# Patient Record
Sex: MALE | Race: WHITE | URBAN - METROPOLITAN AREA
[De-identification: names, ages, dates, MRNs, and addresses within clinical notes are randomized per-mention and may not be internally consistent; named-entity substitution may affect disease eponyms.]

---

## 2024-02-09 ENCOUNTER — PRE-ADMISSION TESTING (OUTPATIENT)
Dept: PREADMISSION TESTING | Facility: HOSPITAL | Age: 69
End: 2024-02-09
Payer: MEDICARE

## 2024-02-09 ENCOUNTER — LAB (OUTPATIENT)
Dept: LAB | Facility: LAB | Age: 69
End: 2024-02-09
Payer: MEDICARE

## 2024-02-09 VITALS
TEMPERATURE: 97.3 F | DIASTOLIC BLOOD PRESSURE: 80 MMHG | BODY MASS INDEX: 28.03 KG/M2 | WEIGHT: 195.77 LBS | RESPIRATION RATE: 16 BRPM | OXYGEN SATURATION: 94 % | HEIGHT: 70 IN | HEART RATE: 94 BPM | SYSTOLIC BLOOD PRESSURE: 134 MMHG

## 2024-02-09 DIAGNOSIS — Z01.818 PRE-OP TESTING: Primary | ICD-10-CM

## 2024-02-09 DIAGNOSIS — M16.11 UNILATERAL PRIMARY OSTEOARTHRITIS, RIGHT HIP: ICD-10-CM

## 2024-02-09 DIAGNOSIS — Z01.818 PRE-OP TESTING: ICD-10-CM

## 2024-02-09 LAB
ANION GAP SERPL CALC-SCNC: 12 MMOL/L (ref 10–20)
BUN SERPL-MCNC: 24 MG/DL (ref 6–23)
CALCIUM SERPL-MCNC: 9.5 MG/DL (ref 8.6–10.3)
CHLORIDE SERPL-SCNC: 101 MMOL/L (ref 98–107)
CO2 SERPL-SCNC: 27 MMOL/L (ref 21–32)
CREAT SERPL-MCNC: 0.69 MG/DL (ref 0.5–1.3)
EGFRCR SERPLBLD CKD-EPI 2021: >90 ML/MIN/1.73M*2
ERYTHROCYTE [DISTWIDTH] IN BLOOD BY AUTOMATED COUNT: 13.4 % (ref 11.5–14.5)
GLUCOSE SERPL-MCNC: 220 MG/DL (ref 74–99)
HCT VFR BLD AUTO: 43.1 % (ref 41–52)
HGB BLD-MCNC: 14.1 G/DL (ref 13.5–17.5)
MCH RBC QN AUTO: 30.5 PG (ref 26–34)
MCHC RBC AUTO-ENTMCNC: 32.7 G/DL (ref 32–36)
MCV RBC AUTO: 93 FL (ref 80–100)
NRBC BLD-RTO: 0 /100 WBCS (ref 0–0)
PLATELET # BLD AUTO: 259 X10*3/UL (ref 150–450)
POTASSIUM SERPL-SCNC: 4.3 MMOL/L (ref 3.5–5.3)
RBC # BLD AUTO: 4.62 X10*6/UL (ref 4.5–5.9)
SODIUM SERPL-SCNC: 136 MMOL/L (ref 136–145)
WBC # BLD AUTO: 10.3 X10*3/UL (ref 4.4–11.3)

## 2024-02-09 PROCEDURE — 87081 CULTURE SCREEN ONLY: CPT | Mod: STJLAB

## 2024-02-09 PROCEDURE — 36415 COLL VENOUS BLD VENIPUNCTURE: CPT

## 2024-02-09 PROCEDURE — 85027 COMPLETE CBC AUTOMATED: CPT

## 2024-02-09 PROCEDURE — 86900 BLOOD TYPING SEROLOGIC ABO: CPT

## 2024-02-09 PROCEDURE — 80048 BASIC METABOLIC PNL TOTAL CA: CPT

## 2024-02-09 PROCEDURE — 93010 ELECTROCARDIOGRAM REPORT: CPT | Performed by: INTERNAL MEDICINE

## 2024-02-09 PROCEDURE — 99204 OFFICE O/P NEW MOD 45 MIN: CPT | Performed by: NURSE PRACTITIONER

## 2024-02-09 PROCEDURE — 86850 RBC ANTIBODY SCREEN: CPT

## 2024-02-09 PROCEDURE — 86901 BLOOD TYPING SEROLOGIC RH(D): CPT

## 2024-02-09 PROCEDURE — 93005 ELECTROCARDIOGRAM TRACING: CPT

## 2024-02-09 RX ORDER — HYDROGEN PEROXIDE 3 %
20 SOLUTION, NON-ORAL MISCELLANEOUS
COMMUNITY

## 2024-02-09 RX ORDER — MINERAL OIL
180 ENEMA (ML) RECTAL DAILY PRN
COMMUNITY

## 2024-02-09 RX ORDER — CHLORHEXIDINE GLUCONATE ORAL RINSE 1.2 MG/ML
SOLUTION DENTAL
Qty: 473 ML | Refills: 0 | Status: SHIPPED | OUTPATIENT
Start: 2024-02-09 | End: 2024-02-20 | Stop reason: HOSPADM

## 2024-02-09 RX ORDER — MULTIVITAMIN/IRON/FOLIC ACID 18MG-0.4MG
1 TABLET ORAL DAILY
COMMUNITY

## 2024-02-09 RX ORDER — PREGABALIN 100 MG/1
100 CAPSULE ORAL 2 TIMES DAILY
COMMUNITY

## 2024-02-09 RX ORDER — ATENOLOL 25 MG/1
25 TABLET ORAL DAILY
COMMUNITY

## 2024-02-09 RX ORDER — L. ACIDOPHILUS/L.BULGARICUS 1MM CELL
1 TABLET ORAL DAILY
COMMUNITY

## 2024-02-09 RX ORDER — ZINC GLUCONATE 50 MG
50 TABLET ORAL DAILY
COMMUNITY

## 2024-02-09 RX ORDER — BISMUTH SUBSALICYLATE 262 MG
1 TABLET,CHEWABLE ORAL DAILY
COMMUNITY

## 2024-02-09 RX ORDER — CYCLOSPORINE 0.5 MG/ML
1 EMULSION OPHTHALMIC 2 TIMES DAILY
COMMUNITY

## 2024-02-09 RX ORDER — METFORMIN HYDROCHLORIDE 500 MG/1
500 TABLET, EXTENDED RELEASE ORAL
COMMUNITY

## 2024-02-09 RX ORDER — MONTELUKAST SODIUM 10 MG/1
10 TABLET ORAL NIGHTLY
COMMUNITY

## 2024-02-09 RX ORDER — ATORVASTATIN CALCIUM 20 MG/1
20 TABLET, FILM COATED ORAL NIGHTLY
COMMUNITY

## 2024-02-09 RX ORDER — FLUTICASONE PROPIONATE AND SALMETEROL 250; 50 UG/1; UG/1
1 POWDER RESPIRATORY (INHALATION)
COMMUNITY

## 2024-02-09 RX ORDER — CHOLECALCIFEROL (VITAMIN D3) 25 MCG
1000 TABLET ORAL DAILY
COMMUNITY

## 2024-02-09 RX ORDER — ASCORBIC ACID 500 MG
500 TABLET ORAL DAILY
COMMUNITY

## 2024-02-09 ASSESSMENT — LIFESTYLE VARIABLES: SMOKING_STATUS: NONSMOKER

## 2024-02-09 ASSESSMENT — CHADS2 SCORE
CHF: NO
PRIOR STROKE OR TIA OR THROMBOEMBOLISM: NO
HYPERTENSION: YES
CHADS2 SCORE: 2
AGE GREATER THAN OR EQUAL TO 75: NO
DIABETES: YES

## 2024-02-09 ASSESSMENT — DUKE ACTIVITY SCORE INDEX (DASI)
DASI METS SCORE: 6.4
TOTAL_SCORE: 29.45
CAN YOU HAVE SEXUAL RELATIONS: NO
CAN YOU DO MODERATE WORK AROUND THE HOUSE LIKE VACUUMING, SWEEPING FLOORS OR CARRYING GROCERIES: YES
CAN YOU WALK INDOORS, SUCH AS AROUND YOUR HOUSE: YES
CAN YOU RUN A SHORT DISTANCE: NO
CAN YOU PARTICIPATE IN MODERATE RECREATIONAL ACTIVITIES LIKE GOLF, BOWLING, DANCING, DOUBLES TENNIS OR THROWING A BASEBALL OR FOOTBALL: YES
CAN YOU CLIMB A FLIGHT OF STAIRS OR WALK UP A HILL: YES
CAN YOU DO HEAVY WORK AROUND THE HOUSE LIKE SCRUBBING FLOORS OR LIFTING AND MOVING HEAVY FURNITURE: NO
CAN YOU PARTICIPATE IN STRENOUS SPORTS LIKE SWIMMING, SINGLES TENNIS, FOOTBALL, BASKETBALL, OR SKIING: NO
CAN YOU WALK A BLOCK OR TWO ON LEVEL GROUND: YES
CAN YOU DO LIGHT WORK AROUND THE HOUSE LIKE DUSTING OR WASHING DISHES: YES
CAN YOU TAKE CARE OF YOURSELF (EAT, DRESS, BATHE, OR USE TOILET): YES
CAN YOU DO YARD WORK LIKE RAKING LEAVES, WEEDING OR PUSHING A MOWER: YES

## 2024-02-09 ASSESSMENT — ACTIVITIES OF DAILY LIVING (ADL): ADL_SCORE: 0

## 2024-02-09 NOTE — CPM/PAT H&P
CPM/PAT Evaluation       Name: John Casale (John Casale)  /Age: 1955/68 y.o.     In-Person       Chief Complaint: right hip pains    HPI    EUNICE is a 67 yo male who has history of left THR with no issues and now has been having right hip pains for few years- imaging shows right hip OA- subsequently he is scheduled for right THR. Skin intact on surgical limb. He denies any recent steroid injections but he is currently on oral steroids for recent URI- has 1-2 more days left. URI symptoms have improved other than lingering dry cough. Otherwise denies any recent fever/chills, chest pains or shortness of breath.     Past Medical History:   Diagnosis Date    Arthritis     Asthma     Cataract     Diabetes mellitus (CMS/HCC)     Diverticulosis     GERD (gastroesophageal reflux disease)     Hyperlipidemia     Hypertension     Liver abscess     Lumbar spondylosis     Palpitations     Sleep apnea     cpap    Spinal stenosis      PCP: Dr. Washington  Pulm: Dr. Perez    Chest Xray 2024  RESULT:    Lines, tubes, and devices:  None.    Lungs and pleura:  No consolidation. No lung mass. No pleural effusion.    Cardiomediastinal silhouette:  Normal cardiomediastinal silhouette.    Other:  Mild degenerative change of the spine     Hemoglobin A1C   Date Value Ref Range Status   2020 7.4 (H) 4.3 - 5.6 % Final     Comment:     American Diabetes Association guidelines indicate that patients with HgbA1c in   the range 5.7-6.4% are at increased risk for development of diabetes, and   intervention by lifestyle modification may be beneficial. HgbA1c greater or   equal to 6.5% is considered diagnostic of diabetes.        Past Surgical History:   Procedure Laterality Date    CATARACT EXTRACTION      COLONOSCOPY      HIP ARTHROPLASTY      OTHER SURGICAL HISTORY      sigmoid colectomy with colostomyand reversal    TONSILLECTOMY      UMBILICAL HERNIA REPAIR      UPPER GASTROINTESTINAL ENDOSCOPY         Patient  reports that he is not  currently sexually active.    Family History   Problem Relation Name Age of Onset    Asthma Mother      Lung cancer Mother      Kidney failure Father      Other (hemodialysis) Father      No Known Problems Sister      Other (smoker) Maternal Grandmother      No Known Problems Maternal Grandfather      No Known Problems Paternal Grandmother      No Known Problems Paternal Grandfather         Allergies   Allergen Reactions    Codeine GI Upset       Prior to Admission medications    Not on File        PAT ROS   Constitutional: Negative for fever, chills, or sweats   ENMT: Negative for nasal discharge, congestion, ear pain, mouth pain, throat pain   Respiratory: Negative wheezing, shortness of breath; positive endorses dry cough     Cardiac: Negative for chest pain, dyspnea on exertion, palpitations   Gastrointestinal: Negative for nausea, vomiting, diarrhea, constipation, abdominal pain  Genitourinary: Negative for dysuria, flank pain, frequency, hematuria     Musculoskeletal: Positive for decreased ROM, pain, swelling, weakness in right hip    Neurological: Negative for dizziness, confusion, headache  Psychiatric: Negative for mood changes   Skin: Negative for itching, rash, ulcer    Hematologic/Lymph: Negative for bruising, easy bleeding  Allergic/Immunologic: Negative itching, sneezing, swelling      Physical Exam  HENT:      Head: Normocephalic.      Mouth/Throat:      Mouth: Mucous membranes are moist.   Eyes:      Extraocular Movements: Extraocular movements intact.   Cardiovascular:      Rate and Rhythm: Normal rate and regular rhythm.   Pulmonary:      Effort: Pulmonary effort is normal.      Breath sounds: Normal breath sounds.   Abdominal:      General: Abdomen is flat.      Palpations: Abdomen is soft.   Musculoskeletal:         General: Normal range of motion.      Cervical back: Normal range of motion.   Skin:     General: Skin is warm and dry.   Neurological:      General: No focal deficit present.       Mental Status: He is alert.   Psychiatric:         Mood and Affect: Mood normal.          PAT AIRWAY:   Airway:     Neck ROM::  Full  normal      Anesthesia:  Patient denies any anesthesia complications.     Visit Vitals  /80   Pulse 94   Temp 36.3 °C (97.3 °F) (Temporal)   Resp 16       DASI Risk Score      Flowsheet Row Most Recent Value   DASI SCORE 29.45   METS Score (Will be calculated only when all the questions are answered) 6.4          Caprini DVT Assessment      Flowsheet Row Most Recent Value   DVT Score 11   Current Status Major surgery planned, including arthroscopic and laproscopic (1-2 hours), Elective major lower extremity arthroplasty   History Prior major surgery   Age 60-75 years   BMI 30 or less          Modified Frailty Index      Flowsheet Row Most Recent Value   Modified Frailty Index Calculator .1818          CHADS2 Stroke Risk  Current as of 9 minutes ago        N/A 3 - 100%: High Risk   2 - 3%: Medium Risk   0 - 2%: Low Risk     Last Change: N/A          This score determines the patient's risk of having a stroke if the patient has atrial fibrillation.        This score is not applicable to this patient. Components are not calculated.          Revised Cardiac Risk Index      Flowsheet Row Most Recent Value   Revised Cardiac Risk Calculator 0          Apfel Simplified Score      Flowsheet Row Most Recent Value   Apfel Simplified Score Calculator 2          Risk Analysis Index Results This Encounter         2/9/2024  1417             WALTERS Cancer History: Patient does not indicate history of cancer    Total Risk Analysis Index Score Without Cancer: 23    Total Risk Analysis Index Score: 23          Stop Bang Score      Flowsheet Row Most Recent Value   Do you snore loudly? 1   Do you often feel tired or fatigued after your sleep? 1   Has anyone ever observed you stop breathing in your sleep? 0   Do you have or are you being treated for high blood pressure? 1   Recent BMI (Calculated) 28.1    Is BMI greater than 35 kg/m2? 0=No   Age older than 50 years old? 1=Yes   Is your neck circumference greater than 17 inches (Male) or 16 inches (Female)? 0   Gender - Male 1=Yes   STOP-BANG Total Score 5            Assessment and Plan:     68-year-old male scheduled for right total hip arthroplasty with posterior approach on 2/19/2024 with Dr. Logan.  Blood work and MRSA ordered-oral chlorhexidine prescribed.  EKG shows NSR with left axis, v rate of 87 bpm- comparable EKG on file in 2022. He is instructed to notify surgeon office if any new skin changes occur to surgical limb. He was instructed to call pulmonology to inquire about new biologic for asthma that he is scheduled to receive next week- he plans to coordinate with him. Otherwise no further orders indicated.     See risk scores as previously documented.

## 2024-02-09 NOTE — H&P (VIEW-ONLY)
CPM/PAT Evaluation       Name: John Casale (John Casale)  /Age: 1955/68 y.o.     In-Person       Chief Complaint: right hip pains    HPI    EUNICE is a 69 yo male who has history of left THR with no issues and now has been having right hip pains for few years- imaging shows right hip OA- subsequently he is scheduled for right THR. Skin intact on surgical limb. He denies any recent steroid injections but he is currently on oral steroids for recent URI- has 1-2 more days left. URI symptoms have improved other than lingering dry cough. Otherwise denies any recent fever/chills, chest pains or shortness of breath.     Past Medical History:   Diagnosis Date    Arthritis     Asthma     Cataract     Diabetes mellitus (CMS/HCC)     Diverticulosis     GERD (gastroesophageal reflux disease)     Hyperlipidemia     Hypertension     Liver abscess     Lumbar spondylosis     Palpitations     Sleep apnea     cpap    Spinal stenosis      PCP: Dr. Washington  Pulm: Dr. Perez    Chest Xray 2024  RESULT:    Lines, tubes, and devices:  None.    Lungs and pleura:  No consolidation. No lung mass. No pleural effusion.    Cardiomediastinal silhouette:  Normal cardiomediastinal silhouette.    Other:  Mild degenerative change of the spine     Hemoglobin A1C   Date Value Ref Range Status   2020 7.4 (H) 4.3 - 5.6 % Final     Comment:     American Diabetes Association guidelines indicate that patients with HgbA1c in   the range 5.7-6.4% are at increased risk for development of diabetes, and   intervention by lifestyle modification may be beneficial. HgbA1c greater or   equal to 6.5% is considered diagnostic of diabetes.        Past Surgical History:   Procedure Laterality Date    CATARACT EXTRACTION      COLONOSCOPY      HIP ARTHROPLASTY      OTHER SURGICAL HISTORY      sigmoid colectomy with colostomyand reversal    TONSILLECTOMY      UMBILICAL HERNIA REPAIR      UPPER GASTROINTESTINAL ENDOSCOPY         Patient  reports that he is not  currently sexually active.    Family History   Problem Relation Name Age of Onset    Asthma Mother      Lung cancer Mother      Kidney failure Father      Other (hemodialysis) Father      No Known Problems Sister      Other (smoker) Maternal Grandmother      No Known Problems Maternal Grandfather      No Known Problems Paternal Grandmother      No Known Problems Paternal Grandfather         Allergies   Allergen Reactions    Codeine GI Upset       Prior to Admission medications    Not on File        PAT ROS   Constitutional: Negative for fever, chills, or sweats   ENMT: Negative for nasal discharge, congestion, ear pain, mouth pain, throat pain   Respiratory: Negative wheezing, shortness of breath; positive endorses dry cough     Cardiac: Negative for chest pain, dyspnea on exertion, palpitations   Gastrointestinal: Negative for nausea, vomiting, diarrhea, constipation, abdominal pain  Genitourinary: Negative for dysuria, flank pain, frequency, hematuria     Musculoskeletal: Positive for decreased ROM, pain, swelling, weakness in right hip    Neurological: Negative for dizziness, confusion, headache  Psychiatric: Negative for mood changes   Skin: Negative for itching, rash, ulcer    Hematologic/Lymph: Negative for bruising, easy bleeding  Allergic/Immunologic: Negative itching, sneezing, swelling      Physical Exam  HENT:      Head: Normocephalic.      Mouth/Throat:      Mouth: Mucous membranes are moist.   Eyes:      Extraocular Movements: Extraocular movements intact.   Cardiovascular:      Rate and Rhythm: Normal rate and regular rhythm.   Pulmonary:      Effort: Pulmonary effort is normal.      Breath sounds: Normal breath sounds.   Abdominal:      General: Abdomen is flat.      Palpations: Abdomen is soft.   Musculoskeletal:         General: Normal range of motion.      Cervical back: Normal range of motion.   Skin:     General: Skin is warm and dry.   Neurological:      General: No focal deficit present.       Mental Status: He is alert.   Psychiatric:         Mood and Affect: Mood normal.          PAT AIRWAY:   Airway:     Neck ROM::  Full  normal      Anesthesia:  Patient denies any anesthesia complications.     Visit Vitals  /80   Pulse 94   Temp 36.3 °C (97.3 °F) (Temporal)   Resp 16       DASI Risk Score      Flowsheet Row Most Recent Value   DASI SCORE 29.45   METS Score (Will be calculated only when all the questions are answered) 6.4          Caprini DVT Assessment      Flowsheet Row Most Recent Value   DVT Score 11   Current Status Major surgery planned, including arthroscopic and laproscopic (1-2 hours), Elective major lower extremity arthroplasty   History Prior major surgery   Age 60-75 years   BMI 30 or less          Modified Frailty Index      Flowsheet Row Most Recent Value   Modified Frailty Index Calculator .1818          CHADS2 Stroke Risk  Current as of 9 minutes ago        N/A 3 - 100%: High Risk   2 - 3%: Medium Risk   0 - 2%: Low Risk     Last Change: N/A          This score determines the patient's risk of having a stroke if the patient has atrial fibrillation.        This score is not applicable to this patient. Components are not calculated.          Revised Cardiac Risk Index      Flowsheet Row Most Recent Value   Revised Cardiac Risk Calculator 0          Apfel Simplified Score      Flowsheet Row Most Recent Value   Apfel Simplified Score Calculator 2          Risk Analysis Index Results This Encounter         2/9/2024  1417             WALTERS Cancer History: Patient does not indicate history of cancer    Total Risk Analysis Index Score Without Cancer: 23    Total Risk Analysis Index Score: 23          Stop Bang Score      Flowsheet Row Most Recent Value   Do you snore loudly? 1   Do you often feel tired or fatigued after your sleep? 1   Has anyone ever observed you stop breathing in your sleep? 0   Do you have or are you being treated for high blood pressure? 1   Recent BMI (Calculated) 28.1    Is BMI greater than 35 kg/m2? 0=No   Age older than 50 years old? 1=Yes   Is your neck circumference greater than 17 inches (Male) or 16 inches (Female)? 0   Gender - Male 1=Yes   STOP-BANG Total Score 5            Assessment and Plan:     68-year-old male scheduled for right total hip arthroplasty with posterior approach on 2/19/2024 with Dr. Logan.  Blood work and MRSA ordered-oral chlorhexidine prescribed.  EKG shows NSR with left axis, v rate of 87 bpm- comparable EKG on file in 2022. He is instructed to notify surgeon office if any new skin changes occur to surgical limb. He was instructed to call pulmonology to inquire about new biologic for asthma that he is scheduled to receive next week- he plans to coordinate with him. Otherwise no further orders indicated.     See risk scores as previously documented.

## 2024-02-09 NOTE — PREPROCEDURE INSTRUCTIONS
Medication List            Accurate as of February 9, 2024  2:43 PM. Always use your most recent med list.                ascorbic acid 500 mg tablet  Commonly known as: Vitamin C  Medication Adjustments for Surgery: Stop 7 days before surgery     atenolol 25 mg tablet  Commonly known as: Tenormin  Medication Adjustments for Surgery: Take morning of surgery with sip of water, no other fluids     atorvastatin 20 mg tablet  Commonly known as: Lipitor  Medication Adjustments for Surgery: Other (Comment)  Notes to patient: May take the morning of surgery if this medication is prescribed to take in the mornings     b complex 0.4 mg tablet  Medication Adjustments for Surgery: Stop 7 days before surgery     cholecalciferol 25 MCG (1000 UT) tablet  Commonly known as: Vitamin D-3  Medication Adjustments for Surgery: Stop 7 days before surgery     cycloSPORINE 0.05 % ophthalmic emulsion  Commonly known as: Restasis  Medication Adjustments for Surgery: Continue until night before surgery     esomeprazole 20 mg DR capsule  Commonly known as: NexIUM  Medication Adjustments for Surgery: Take morning of surgery with sip of water, no other fluids     fexofenadine 180 mg tablet  Commonly known as: Allegra  Medication Adjustments for Surgery: Take morning of surgery with sip of water, no other fluids     fluticasone propion-salmeteroL 250-50 mcg/dose diskus inhaler  Commonly known as: Advair Diskus  Medication Adjustments for Surgery: Other (Comment)  Notes to patient: May take the morning of surgery if this medication is prescribed to take in the mornings     lactobacillus acidophilus tablet tablet  Medication Adjustments for Surgery: Stop 7 days before surgery     metFORMIN  mg 24 hr tablet  Commonly known as: Glucophage-XR  Medication Adjustments for Surgery: Other (Comment)  Notes to patient: HOLD any evening dose the night before the day of surgery  HOLD the day of surgery     montelukast 10 mg tablet  Commonly known  as: Singulair  Medication Adjustments for Surgery: Take morning of surgery with sip of water, no other fluids     multivitamin tablet  Medication Adjustments for Surgery: Stop 7 days before surgery     pregabalin 100 mg capsule  Commonly known as: Lyrica  Medication Adjustments for Surgery: Take morning of surgery with sip of water, no other fluids     zinc gluconate 50 mg tablet  Medication Adjustments for Surgery: Stop 7 days before surgery                                        PRE-OPERATIVE INSTRUCTIONS    You will receive notification one business day prior to your surgery to confirm your arrival time and additional information. It is important that you answer your phone and/or check your messages during this time.    Please enter the building through the Outpatient entrance. Take the elevator off the lobby to the 2nd floor and check in at the Outpatient Surgery desk    INSTRUCTIONS:  Talk to your surgeon for instructions if you should stop your aspirin, blood thinner, or diabetes medicines.  DO NOT take any multivitamins or over the counter supplements for 7-10 days before surgery.  If not being admitted, you must have an adult immediately available to drive you home after surgery. We also highly recommend you have someone stay with you for the entire day and night of your surgery.  For children having surgery, a parent or legal guardian must accompany them to the surgery center. If this is not possible, please call [phone] to make additional arrangements.  For adults who are unable to consent or make medical decisions for themselves, a legal guardian or Power of  must accompany them to the surgery center. If this is not possible, please call [phone] to make additional arrangements.  Wear comfortable, loose fitting clothing.  All jewelry and piercings must be removed. If you are unable to remove an item or have a dermal piercing, please be sure to tell the nurse when you arrive for  surgery.  Nail polish and make-up must be removed.  Avoid smoking or consuming alcohol for 24 hours before surgery.  To help prevent infection, please take a shower/bath and wash your hair the night before and/or morning of surgery.    Additional instructions about eating and drinking before surgery:  Do not eat any solid foods after midnight. Milk, nutritional drinks/supplements, and infant formula are considered solid foods.  You may drink up to 12 oz. of clear liquids up to 2 hours before your arrival time for surgery, unless directed otherwise by your surgeon. Clear liquids include water, non-carbonated sports drinks (Gatorade), black tea or coffee (no creamers) and breast milk.    If you received a blue folder, please review additional information provided inside the folder regarding additional preparation.     If you have any questions or concerns, please call Pre-Admission Testing at [phone].

## 2024-02-10 LAB
ABO GROUP (TYPE) IN BLOOD: NORMAL
ANTIBODY SCREEN: NORMAL
RH FACTOR (ANTIGEN D): NORMAL

## 2024-02-11 LAB
ATRIAL RATE: 87 BPM
P AXIS: 54 DEGREES
P OFFSET: 189 MS
P ONSET: 139 MS
PR INTERVAL: 148 MS
Q ONSET: 213 MS
QRS COUNT: 14 BEATS
QRS DURATION: 84 MS
QT INTERVAL: 354 MS
QTC CALCULATION(BAZETT): 425 MS
QTC FREDERICIA: 400 MS
R AXIS: -42 DEGREES
STAPHYLOCOCCUS SPEC CULT: NORMAL
T AXIS: 3 DEGREES
T OFFSET: 390 MS
VENTRICULAR RATE: 87 BPM

## 2024-02-19 ENCOUNTER — ANESTHESIA EVENT (OUTPATIENT)
Dept: OPERATING ROOM | Facility: HOSPITAL | Age: 69
End: 2024-02-19
Payer: MEDICARE

## 2024-02-19 ENCOUNTER — HOSPITAL ENCOUNTER (OUTPATIENT)
Facility: HOSPITAL | Age: 69
LOS: 1 days | Discharge: HOME | End: 2024-02-20
Attending: ORTHOPAEDIC SURGERY | Admitting: ORTHOPAEDIC SURGERY
Payer: MEDICARE

## 2024-02-19 ENCOUNTER — ANESTHESIA (OUTPATIENT)
Dept: OPERATING ROOM | Facility: HOSPITAL | Age: 69
End: 2024-02-19
Payer: MEDICARE

## 2024-02-19 ENCOUNTER — APPOINTMENT (OUTPATIENT)
Dept: RADIOLOGY | Facility: HOSPITAL | Age: 69
End: 2024-02-19
Payer: MEDICARE

## 2024-02-19 DIAGNOSIS — Z96.641 S/P TOTAL RIGHT HIP ARTHROPLASTY: ICD-10-CM

## 2024-02-19 DIAGNOSIS — M16.11 PRIMARY OSTEOARTHRITIS OF RIGHT HIP: Primary | ICD-10-CM

## 2024-02-19 DIAGNOSIS — G89.18 ACUTE POSTOPERATIVE PAIN: ICD-10-CM

## 2024-02-19 PROBLEM — E11.9 DIABETES MELLITUS (MULTI): Chronic | Status: ACTIVE | Noted: 2024-02-19

## 2024-02-19 PROBLEM — J45.909 ASTHMA (HHS-HCC): Status: ACTIVE | Noted: 2024-02-19

## 2024-02-19 LAB
GLUCOSE BLD MANUAL STRIP-MCNC: 168 MG/DL (ref 74–99)
GLUCOSE BLD MANUAL STRIP-MCNC: 179 MG/DL (ref 74–99)
GLUCOSE BLD MANUAL STRIP-MCNC: 233 MG/DL (ref 74–99)

## 2024-02-19 PROCEDURE — A4217 STERILE WATER/SALINE, 500 ML: HCPCS | Performed by: ORTHOPAEDIC SURGERY

## 2024-02-19 PROCEDURE — 2500000005 HC RX 250 GENERAL PHARMACY W/O HCPCS: Performed by: ORTHOPAEDIC SURGERY

## 2024-02-19 PROCEDURE — 72170 X-RAY EXAM OF PELVIS: CPT

## 2024-02-19 PROCEDURE — 97116 GAIT TRAINING THERAPY: CPT | Mod: GP

## 2024-02-19 PROCEDURE — 2500000002 HC RX 250 W HCPCS SELF ADMINISTERED DRUGS (ALT 637 FOR MEDICARE OP, ALT 636 FOR OP/ED): Performed by: ORTHOPAEDIC SURGERY

## 2024-02-19 PROCEDURE — C1776 JOINT DEVICE (IMPLANTABLE): HCPCS | Performed by: ORTHOPAEDIC SURGERY

## 2024-02-19 PROCEDURE — A27130 PR TOTAL HIP ARTHROPLASTY: Performed by: ANESTHESIOLOGY

## 2024-02-19 PROCEDURE — 2500000005 HC RX 250 GENERAL PHARMACY W/O HCPCS: Performed by: ANESTHESIOLOGIST ASSISTANT

## 2024-02-19 PROCEDURE — 7100000002 HC RECOVERY ROOM TIME - EACH INCREMENTAL 1 MINUTE: Performed by: ORTHOPAEDIC SURGERY

## 2024-02-19 PROCEDURE — 3600000018 HC OR TIME - INITIAL BASE CHARGE - PROCEDURE LEVEL SIX: Performed by: ORTHOPAEDIC SURGERY

## 2024-02-19 PROCEDURE — C1713 ANCHOR/SCREW BN/BN,TIS/BN: HCPCS | Performed by: ORTHOPAEDIC SURGERY

## 2024-02-19 PROCEDURE — 2500000004 HC RX 250 GENERAL PHARMACY W/ HCPCS (ALT 636 FOR OP/ED): Performed by: ANESTHESIOLOGIST ASSISTANT

## 2024-02-19 PROCEDURE — 7100000011 HC EXTENDED STAY RECOVERY HOURLY - NURSING UNIT

## 2024-02-19 PROCEDURE — 94640 AIRWAY INHALATION TREATMENT: CPT

## 2024-02-19 PROCEDURE — 82947 ASSAY GLUCOSE BLOOD QUANT: CPT

## 2024-02-19 PROCEDURE — 2500000005 HC RX 250 GENERAL PHARMACY W/O HCPCS: Performed by: ANESTHESIOLOGY

## 2024-02-19 PROCEDURE — 7100000001 HC RECOVERY ROOM TIME - INITIAL BASE CHARGE: Performed by: ORTHOPAEDIC SURGERY

## 2024-02-19 PROCEDURE — 3600000017 HC OR TIME - EACH INCREMENTAL 1 MINUTE - PROCEDURE LEVEL SIX: Performed by: ORTHOPAEDIC SURGERY

## 2024-02-19 PROCEDURE — 72170 X-RAY EXAM OF PELVIS: CPT | Performed by: RADIOLOGY

## 2024-02-19 PROCEDURE — 2500000004 HC RX 250 GENERAL PHARMACY W/ HCPCS (ALT 636 FOR OP/ED): Performed by: ORTHOPAEDIC SURGERY

## 2024-02-19 PROCEDURE — 2720000007 HC OR 272 NO HCPCS: Performed by: ORTHOPAEDIC SURGERY

## 2024-02-19 PROCEDURE — 3700000001 HC GENERAL ANESTHESIA TIME - INITIAL BASE CHARGE: Performed by: ORTHOPAEDIC SURGERY

## 2024-02-19 PROCEDURE — 2780000003 HC OR 278 NO HCPCS: Performed by: ORTHOPAEDIC SURGERY

## 2024-02-19 PROCEDURE — 3700000002 HC GENERAL ANESTHESIA TIME - EACH INCREMENTAL 1 MINUTE: Performed by: ORTHOPAEDIC SURGERY

## 2024-02-19 PROCEDURE — 2500000004 HC RX 250 GENERAL PHARMACY W/ HCPCS (ALT 636 FOR OP/ED): Performed by: ANESTHESIOLOGY

## 2024-02-19 PROCEDURE — A27130 PR TOTAL HIP ARTHROPLASTY: Performed by: ANESTHESIOLOGIST ASSISTANT

## 2024-02-19 PROCEDURE — 2500000001 HC RX 250 WO HCPCS SELF ADMINISTERED DRUGS (ALT 637 FOR MEDICARE OP): Performed by: ORTHOPAEDIC SURGERY

## 2024-02-19 PROCEDURE — 97161 PT EVAL LOW COMPLEX 20 MIN: CPT | Mod: GP

## 2024-02-19 DEVICE — PINNACLE GRIPTION ACETABULAR SHELL MULTI-HOLE 56MM OD
Type: IMPLANTABLE DEVICE | Site: HIP | Status: FUNCTIONAL
Brand: PINNACLE GRIPTION

## 2024-02-19 DEVICE — PINNACLE HIP SOLUTIONS ALTRX POLYETHYLENE ACETABULAR LINER NEUTRAL 36MM ID 56MM OD
Type: IMPLANTABLE DEVICE | Site: HIP | Status: FUNCTIONAL
Brand: PINNACLE ALTRX

## 2024-02-19 DEVICE — BIOLOX DELTA CERAMIC FEMORAL HEAD +1.5 36MM DIA 12/14 TAPER
Type: IMPLANTABLE DEVICE | Site: HIP | Status: FUNCTIONAL
Brand: BIOLOX DELTA

## 2024-02-19 DEVICE — ACTIS DUOFIX HIP PROSTHESIS (FEMORAL STEM 12/14 TAPER CEMENTLESS SIZE 4 HIGH COLLAR)  CE
Type: IMPLANTABLE DEVICE | Site: HIP | Status: FUNCTIONAL
Brand: ACTIS

## 2024-02-19 DEVICE — PINNACLE CANCELLOUS BONE SCREW 6.5MM X 25MM
Type: IMPLANTABLE DEVICE | Site: HIP | Status: FUNCTIONAL
Brand: PINNACLE

## 2024-02-19 RX ORDER — MAGNESIUM HYDROXIDE 2400 MG/10ML
10 SUSPENSION ORAL DAILY PRN
Status: DISCONTINUED | OUTPATIENT
Start: 2024-02-19 | End: 2024-02-20 | Stop reason: HOSPADM

## 2024-02-19 RX ORDER — ACETAMINOPHEN 325 MG/1
975 TABLET ORAL EVERY 8 HOURS SCHEDULED
Status: DISCONTINUED | OUTPATIENT
Start: 2024-02-19 | End: 2024-02-20 | Stop reason: HOSPADM

## 2024-02-19 RX ORDER — PROPOFOL 10 MG/ML
INJECTION, EMULSION INTRAVENOUS AS NEEDED
Status: DISCONTINUED | OUTPATIENT
Start: 2024-02-19 | End: 2024-02-19

## 2024-02-19 RX ORDER — OXYCODONE HYDROCHLORIDE 5 MG/1
5 TABLET ORAL EVERY 4 HOURS PRN
Status: DISCONTINUED | OUTPATIENT
Start: 2024-02-19 | End: 2024-02-20 | Stop reason: HOSPADM

## 2024-02-19 RX ORDER — DEXAMETHASONE SODIUM PHOSPHATE 4 MG/ML
10 INJECTION, SOLUTION INTRA-ARTICULAR; INTRALESIONAL; INTRAMUSCULAR; INTRAVENOUS; SOFT TISSUE ONCE
Status: COMPLETED | OUTPATIENT
Start: 2024-02-20 | End: 2024-02-20

## 2024-02-19 RX ORDER — ALBUTEROL SULFATE 0.83 MG/ML
2.5 SOLUTION RESPIRATORY (INHALATION)
Status: DISCONTINUED | OUTPATIENT
Start: 2024-02-19 | End: 2024-02-19 | Stop reason: HOSPADM

## 2024-02-19 RX ORDER — SODIUM CHLORIDE, SODIUM LACTATE, POTASSIUM CHLORIDE, CALCIUM CHLORIDE 600; 310; 30; 20 MG/100ML; MG/100ML; MG/100ML; MG/100ML
100 INJECTION, SOLUTION INTRAVENOUS CONTINUOUS
Status: DISCONTINUED | OUTPATIENT
Start: 2024-02-19 | End: 2024-02-19

## 2024-02-19 RX ORDER — PREGABALIN 100 MG/1
100 CAPSULE ORAL 2 TIMES DAILY
Status: DISCONTINUED | OUTPATIENT
Start: 2024-02-19 | End: 2024-02-20 | Stop reason: HOSPADM

## 2024-02-19 RX ORDER — MIDAZOLAM HYDROCHLORIDE 1 MG/ML
1 INJECTION, SOLUTION INTRAMUSCULAR; INTRAVENOUS ONCE AS NEEDED
Status: DISCONTINUED | OUTPATIENT
Start: 2024-02-19 | End: 2024-02-19 | Stop reason: HOSPADM

## 2024-02-19 RX ORDER — LORATADINE 10 MG/1
10 TABLET ORAL DAILY
Status: DISCONTINUED | OUTPATIENT
Start: 2024-02-20 | End: 2024-02-20 | Stop reason: HOSPADM

## 2024-02-19 RX ORDER — BUDESONIDE 0.5 MG/2ML
0.5 INHALANT ORAL
Status: DISCONTINUED | OUTPATIENT
Start: 2024-02-19 | End: 2024-02-20 | Stop reason: HOSPADM

## 2024-02-19 RX ORDER — AMOXICILLIN 250 MG
1 CAPSULE ORAL DAILY
Status: DISCONTINUED | OUTPATIENT
Start: 2024-02-20 | End: 2024-02-20 | Stop reason: HOSPADM

## 2024-02-19 RX ORDER — ONDANSETRON HYDROCHLORIDE 2 MG/ML
INJECTION, SOLUTION INTRAVENOUS AS NEEDED
Status: DISCONTINUED | OUTPATIENT
Start: 2024-02-19 | End: 2024-02-19

## 2024-02-19 RX ORDER — CYCLOSPORINE 0.5 MG/ML
1 EMULSION OPHTHALMIC 2 TIMES DAILY
Status: DISCONTINUED | OUTPATIENT
Start: 2024-02-19 | End: 2024-02-19 | Stop reason: ALTCHOICE

## 2024-02-19 RX ORDER — HYDROMORPHONE HYDROCHLORIDE 1 MG/ML
0.5 INJECTION, SOLUTION INTRAMUSCULAR; INTRAVENOUS; SUBCUTANEOUS EVERY 5 MIN PRN
Status: DISCONTINUED | OUTPATIENT
Start: 2024-02-19 | End: 2024-02-19 | Stop reason: HOSPADM

## 2024-02-19 RX ORDER — HYDROMORPHONE HYDROCHLORIDE 1 MG/ML
1 INJECTION, SOLUTION INTRAMUSCULAR; INTRAVENOUS; SUBCUTANEOUS EVERY 5 MIN PRN
Status: DISCONTINUED | OUTPATIENT
Start: 2024-02-19 | End: 2024-02-19 | Stop reason: HOSPADM

## 2024-02-19 RX ORDER — PANTOPRAZOLE SODIUM 20 MG/1
20 TABLET, DELAYED RELEASE ORAL
Status: DISCONTINUED | OUTPATIENT
Start: 2024-02-20 | End: 2024-02-19 | Stop reason: SDUPTHER

## 2024-02-19 RX ORDER — SODIUM CHLORIDE, SODIUM LACTATE, POTASSIUM CHLORIDE, CALCIUM CHLORIDE 600; 310; 30; 20 MG/100ML; MG/100ML; MG/100ML; MG/100ML
100 INJECTION, SOLUTION INTRAVENOUS CONTINUOUS
Status: DISCONTINUED | OUTPATIENT
Start: 2024-02-19 | End: 2024-02-19 | Stop reason: HOSPADM

## 2024-02-19 RX ORDER — NEOSTIGMINE METHYLSULFATE 1 MG/ML
INJECTION, SOLUTION INTRAVENOUS AS NEEDED
Status: DISCONTINUED | OUTPATIENT
Start: 2024-02-19 | End: 2024-02-19

## 2024-02-19 RX ORDER — ATORVASTATIN CALCIUM 20 MG/1
20 TABLET, FILM COATED ORAL NIGHTLY
Status: DISCONTINUED | OUTPATIENT
Start: 2024-02-19 | End: 2024-02-20 | Stop reason: HOSPADM

## 2024-02-19 RX ORDER — ACETAMINOPHEN 325 MG/1
975 TABLET ORAL ONCE
Status: COMPLETED | OUTPATIENT
Start: 2024-02-19 | End: 2024-02-19

## 2024-02-19 RX ORDER — LABETALOL HYDROCHLORIDE 5 MG/ML
5 INJECTION, SOLUTION INTRAVENOUS
Status: DISCONTINUED | OUTPATIENT
Start: 2024-02-19 | End: 2024-02-19 | Stop reason: HOSPADM

## 2024-02-19 RX ORDER — ROCURONIUM BROMIDE 10 MG/ML
INJECTION, SOLUTION INTRAVENOUS AS NEEDED
Status: DISCONTINUED | OUTPATIENT
Start: 2024-02-19 | End: 2024-02-19

## 2024-02-19 RX ORDER — ASPIRIN 81 MG/1
81 TABLET ORAL 2 TIMES DAILY
Status: DISCONTINUED | OUTPATIENT
Start: 2024-02-19 | End: 2024-02-20 | Stop reason: HOSPADM

## 2024-02-19 RX ORDER — SODIUM CHLORIDE 0.9 G/100ML
IRRIGANT IRRIGATION AS NEEDED
Status: DISCONTINUED | OUTPATIENT
Start: 2024-02-19 | End: 2024-02-19 | Stop reason: HOSPADM

## 2024-02-19 RX ORDER — MORPHINE SULFATE 2 MG/ML
1 INJECTION, SOLUTION INTRAMUSCULAR; INTRAVENOUS EVERY 2 HOUR PRN
Status: DISCONTINUED | OUTPATIENT
Start: 2024-02-19 | End: 2024-02-20 | Stop reason: HOSPADM

## 2024-02-19 RX ORDER — MONTELUKAST SODIUM 10 MG/1
10 TABLET ORAL NIGHTLY
Status: DISCONTINUED | OUTPATIENT
Start: 2024-02-19 | End: 2024-02-20 | Stop reason: HOSPADM

## 2024-02-19 RX ORDER — CELECOXIB 200 MG/1
200 CAPSULE ORAL DAILY
Status: DISCONTINUED | OUTPATIENT
Start: 2024-02-20 | End: 2024-02-20 | Stop reason: HOSPADM

## 2024-02-19 RX ORDER — HYDRALAZINE HYDROCHLORIDE 20 MG/ML
5 INJECTION INTRAMUSCULAR; INTRAVENOUS EVERY 30 MIN PRN
Status: DISCONTINUED | OUTPATIENT
Start: 2024-02-19 | End: 2024-02-19 | Stop reason: HOSPADM

## 2024-02-19 RX ORDER — DIPHENHYDRAMINE HYDROCHLORIDE 50 MG/ML
12.5 INJECTION INTRAMUSCULAR; INTRAVENOUS EVERY 6 HOURS PRN
Status: DISCONTINUED | OUTPATIENT
Start: 2024-02-19 | End: 2024-02-20 | Stop reason: HOSPADM

## 2024-02-19 RX ORDER — DEXAMETHASONE SODIUM PHOSPHATE 10 MG/ML
INJECTION INTRAMUSCULAR; INTRAVENOUS AS NEEDED
Status: DISCONTINUED | OUTPATIENT
Start: 2024-02-19 | End: 2024-02-19

## 2024-02-19 RX ORDER — MIDAZOLAM HYDROCHLORIDE 1 MG/ML
INJECTION, SOLUTION INTRAMUSCULAR; INTRAVENOUS AS NEEDED
Status: DISCONTINUED | OUTPATIENT
Start: 2024-02-19 | End: 2024-02-19

## 2024-02-19 RX ORDER — CEFAZOLIN SODIUM 2 G/100ML
2 INJECTION, SOLUTION INTRAVENOUS ONCE
Status: COMPLETED | OUTPATIENT
Start: 2024-02-19 | End: 2024-02-19

## 2024-02-19 RX ORDER — HYDROCODONE BITARTRATE AND ACETAMINOPHEN 5; 325 MG/1; MG/1
1 TABLET ORAL EVERY 4 HOURS PRN
Status: DISCONTINUED | OUTPATIENT
Start: 2024-02-19 | End: 2024-02-19 | Stop reason: HOSPADM

## 2024-02-19 RX ORDER — FLUTICASONE FUROATE AND VILANTEROL 200; 25 UG/1; UG/1
1 POWDER RESPIRATORY (INHALATION)
Status: DISCONTINUED | OUTPATIENT
Start: 2024-02-19 | End: 2024-02-19 | Stop reason: ALTCHOICE

## 2024-02-19 RX ORDER — FENTANYL CITRATE 50 UG/ML
INJECTION, SOLUTION INTRAMUSCULAR; INTRAVENOUS AS NEEDED
Status: DISCONTINUED | OUTPATIENT
Start: 2024-02-19 | End: 2024-02-19

## 2024-02-19 RX ORDER — ATENOLOL 25 MG/1
25 TABLET ORAL DAILY
Status: DISCONTINUED | OUTPATIENT
Start: 2024-02-20 | End: 2024-02-20 | Stop reason: HOSPADM

## 2024-02-19 RX ORDER — ASCORBIC ACID 500 MG
500 TABLET ORAL DAILY
Status: DISCONTINUED | OUTPATIENT
Start: 2024-02-20 | End: 2024-02-20 | Stop reason: HOSPADM

## 2024-02-19 RX ORDER — METOCLOPRAMIDE HYDROCHLORIDE 5 MG/ML
10 INJECTION INTRAMUSCULAR; INTRAVENOUS ONCE AS NEEDED
Status: DISCONTINUED | OUTPATIENT
Start: 2024-02-19 | End: 2024-02-19 | Stop reason: HOSPADM

## 2024-02-19 RX ORDER — OXYCODONE HYDROCHLORIDE 10 MG/1
10 TABLET ORAL EVERY 4 HOURS PRN
Status: DISCONTINUED | OUTPATIENT
Start: 2024-02-19 | End: 2024-02-20 | Stop reason: HOSPADM

## 2024-02-19 RX ORDER — METFORMIN HYDROCHLORIDE 500 MG/1
500 TABLET, EXTENDED RELEASE ORAL
Status: DISCONTINUED | OUTPATIENT
Start: 2024-02-20 | End: 2024-02-20 | Stop reason: HOSPADM

## 2024-02-19 RX ORDER — PANTOPRAZOLE SODIUM 20 MG/1
20 TABLET, DELAYED RELEASE ORAL
Status: DISCONTINUED | OUTPATIENT
Start: 2024-02-20 | End: 2024-02-20 | Stop reason: HOSPADM

## 2024-02-19 RX ORDER — TRANEXAMIC ACID 650 MG/1
1300 TABLET ORAL ONCE
Status: COMPLETED | OUTPATIENT
Start: 2024-02-19 | End: 2024-02-19

## 2024-02-19 RX ORDER — SODIUM CHLORIDE, SODIUM LACTATE, POTASSIUM CHLORIDE, CALCIUM CHLORIDE 600; 310; 30; 20 MG/100ML; MG/100ML; MG/100ML; MG/100ML
125 INJECTION, SOLUTION INTRAVENOUS CONTINUOUS
Status: DISCONTINUED | OUTPATIENT
Start: 2024-02-19 | End: 2024-02-20

## 2024-02-19 RX ORDER — FORMOTEROL FUMARATE DIHYDRATE 20 UG/2ML
20 SOLUTION RESPIRATORY (INHALATION)
Status: DISCONTINUED | OUTPATIENT
Start: 2024-02-19 | End: 2024-02-20 | Stop reason: HOSPADM

## 2024-02-19 RX ORDER — CEFAZOLIN SODIUM 2 G/100ML
2 INJECTION, SOLUTION INTRAVENOUS EVERY 8 HOURS
Status: COMPLETED | OUTPATIENT
Start: 2024-02-19 | End: 2024-02-20

## 2024-02-19 RX ORDER — DIPHENHYDRAMINE HYDROCHLORIDE 50 MG/ML
12.5 INJECTION INTRAMUSCULAR; INTRAVENOUS ONCE AS NEEDED
Status: DISCONTINUED | OUTPATIENT
Start: 2024-02-19 | End: 2024-02-19 | Stop reason: HOSPADM

## 2024-02-19 RX ORDER — DEXAMETHASONE SODIUM PHOSPHATE 4 MG/ML
10 INJECTION, SOLUTION INTRA-ARTICULAR; INTRALESIONAL; INTRAMUSCULAR; INTRAVENOUS; SOFT TISSUE ONCE
Status: COMPLETED | OUTPATIENT
Start: 2024-02-19 | End: 2024-02-19

## 2024-02-19 RX ORDER — NALOXONE HYDROCHLORIDE 0.4 MG/ML
0.2 INJECTION, SOLUTION INTRAMUSCULAR; INTRAVENOUS; SUBCUTANEOUS EVERY 5 MIN PRN
Status: DISCONTINUED | OUTPATIENT
Start: 2024-02-19 | End: 2024-02-20 | Stop reason: HOSPADM

## 2024-02-19 RX ORDER — LIDOCAINE HYDROCHLORIDE 20 MG/ML
INJECTION, SOLUTION EPIDURAL; INFILTRATION; INTRACAUDAL; PERINEURAL AS NEEDED
Status: DISCONTINUED | OUTPATIENT
Start: 2024-02-19 | End: 2024-02-19

## 2024-02-19 RX ORDER — GLYCOPYRROLATE 0.2 MG/ML
INJECTION INTRAMUSCULAR; INTRAVENOUS AS NEEDED
Status: DISCONTINUED | OUTPATIENT
Start: 2024-02-19 | End: 2024-02-19

## 2024-02-19 RX ORDER — HYDROMORPHONE HYDROCHLORIDE 1 MG/ML
INJECTION, SOLUTION INTRAMUSCULAR; INTRAVENOUS; SUBCUTANEOUS AS NEEDED
Status: DISCONTINUED | OUTPATIENT
Start: 2024-02-19 | End: 2024-02-19

## 2024-02-19 RX ORDER — OXYCODONE HYDROCHLORIDE 5 MG/1
2.5 TABLET ORAL EVERY 4 HOURS PRN
Status: DISCONTINUED | OUTPATIENT
Start: 2024-02-19 | End: 2024-02-20 | Stop reason: HOSPADM

## 2024-02-19 RX ADMIN — ACETAMINOPHEN 975 MG: 325 TABLET ORAL at 22:06

## 2024-02-19 RX ADMIN — OXYCODONE HYDROCHLORIDE 10 MG: 10 TABLET ORAL at 20:29

## 2024-02-19 RX ADMIN — TRANEXAMIC ACID 1300 MG: 650 TABLET ORAL at 09:01

## 2024-02-19 RX ADMIN — FENTANYL CITRATE 100 MCG: 50 INJECTION, SOLUTION INTRAMUSCULAR; INTRAVENOUS at 11:11

## 2024-02-19 RX ADMIN — HYDROMORPHONE HYDROCHLORIDE 0.5 MG: 1 INJECTION, SOLUTION INTRAMUSCULAR; INTRAVENOUS; SUBCUTANEOUS at 14:50

## 2024-02-19 RX ADMIN — FORMOTEROL FUMARATE 20 MCG: 20 SOLUTION RESPIRATORY (INHALATION) at 19:54

## 2024-02-19 RX ADMIN — OXYCODONE HYDROCHLORIDE 5 MG: 5 TABLET ORAL at 16:01

## 2024-02-19 RX ADMIN — ACETAMINOPHEN 975 MG: 325 TABLET ORAL at 09:01

## 2024-02-19 RX ADMIN — HYDROMORPHONE HYDROCHLORIDE 0.5 MG: 1 INJECTION, SOLUTION INTRAMUSCULAR; INTRAVENOUS; SUBCUTANEOUS at 14:13

## 2024-02-19 RX ADMIN — ROCURONIUM BROMIDE 50 MG: 10 INJECTION INTRAVENOUS at 11:11

## 2024-02-19 RX ADMIN — NEOSTIGMINE METHYLSULFATE 4 MG: 1 INJECTION INTRAVENOUS at 13:08

## 2024-02-19 RX ADMIN — POLYVINYL ALCOHOL, POVIDONE 1 DROP: 14; 6 SOLUTION/ DROPS OPHTHALMIC at 20:29

## 2024-02-19 RX ADMIN — DEXAMETHASONE SODIUM PHOSPHATE 10 MG: 10 INJECTION INTRAMUSCULAR; INTRAVENOUS at 11:15

## 2024-02-19 RX ADMIN — BUDESONIDE INHALATION 0.5 MG: 0.5 SUSPENSION RESPIRATORY (INHALATION) at 19:54

## 2024-02-19 RX ADMIN — SODIUM CHLORIDE, POTASSIUM CHLORIDE, SODIUM LACTATE AND CALCIUM CHLORIDE: 600; 310; 30; 20 INJECTION, SOLUTION INTRAVENOUS at 10:29

## 2024-02-19 RX ADMIN — Medication 2 L/MIN: at 14:00

## 2024-02-19 RX ADMIN — PROPOFOL 150 MG: 10 INJECTION, EMULSION INTRAVENOUS at 11:11

## 2024-02-19 RX ADMIN — SODIUM CHLORIDE, POTASSIUM CHLORIDE, SODIUM LACTATE AND CALCIUM CHLORIDE 125 ML/HR: 600; 310; 30; 20 INJECTION, SOLUTION INTRAVENOUS at 16:01

## 2024-02-19 RX ADMIN — ACETAMINOPHEN 975 MG: 325 TABLET ORAL at 16:01

## 2024-02-19 RX ADMIN — HYDROMORPHONE HYDROCHLORIDE 1 MG: 1 INJECTION, SOLUTION INTRAMUSCULAR; INTRAVENOUS; SUBCUTANEOUS at 13:51

## 2024-02-19 RX ADMIN — ASPIRIN 81 MG: 81 TABLET, COATED ORAL at 20:29

## 2024-02-19 RX ADMIN — DEXAMETHASONE SODIUM PHOSPHATE 10 MG: 4 INJECTION, SOLUTION INTRAMUSCULAR; INTRAVENOUS at 17:01

## 2024-02-19 RX ADMIN — Medication 2 L/MIN: at 15:45

## 2024-02-19 RX ADMIN — LIDOCAINE HYDROCHLORIDE 60 MG: 20 INJECTION, SOLUTION EPIDURAL; INFILTRATION; INTRACAUDAL; PERINEURAL at 11:11

## 2024-02-19 RX ADMIN — ROCURONIUM BROMIDE 20 MG: 10 INJECTION INTRAVENOUS at 11:55

## 2024-02-19 RX ADMIN — MIDAZOLAM 2 MG: 1 INJECTION INTRAMUSCULAR; INTRAVENOUS at 11:00

## 2024-02-19 RX ADMIN — ROCURONIUM BROMIDE 10 MG: 10 INJECTION INTRAVENOUS at 12:28

## 2024-02-19 RX ADMIN — ONDANSETRON 4 MG: 2 INJECTION, SOLUTION INTRAMUSCULAR; INTRAVENOUS at 12:56

## 2024-02-19 RX ADMIN — PROPOFOL 350 MG: 10 INJECTION, EMULSION INTRAVENOUS at 11:18

## 2024-02-19 RX ADMIN — GLYCOPYRROLATE 0.6 MG: 0.2 INJECTION, SOLUTION INTRAMUSCULAR; INTRAVENOUS at 13:08

## 2024-02-19 RX ADMIN — MORPHINE SULFATE 1 MG: 2 INJECTION, SOLUTION INTRAMUSCULAR; INTRAVENOUS at 18:33

## 2024-02-19 RX ADMIN — HYDROMORPHONE HYDROCHLORIDE 1 MG: 1 INJECTION, SOLUTION INTRAMUSCULAR; INTRAVENOUS; SUBCUTANEOUS at 12:56

## 2024-02-19 RX ADMIN — CEFAZOLIN SODIUM 2 G: 2 INJECTION, SOLUTION INTRAVENOUS at 18:28

## 2024-02-19 RX ADMIN — CEFAZOLIN SODIUM 2 G: 2 INJECTION, SOLUTION INTRAVENOUS at 11:15

## 2024-02-19 SDOH — SOCIAL STABILITY: SOCIAL INSECURITY: WERE YOU ABLE TO COMPLETE ALL THE BEHAVIORAL HEALTH SCREENINGS?: YES

## 2024-02-19 SDOH — SOCIAL STABILITY: SOCIAL INSECURITY: ARE YOU OR HAVE YOU BEEN THREATENED OR ABUSED PHYSICALLY, EMOTIONALLY, OR SEXUALLY BY ANYONE?: NO

## 2024-02-19 SDOH — SOCIAL STABILITY: SOCIAL INSECURITY: ARE THERE ANY APPARENT SIGNS OF INJURIES/BEHAVIORS THAT COULD BE RELATED TO ABUSE/NEGLECT?: NO

## 2024-02-19 SDOH — SOCIAL STABILITY: SOCIAL INSECURITY: HAVE YOU HAD THOUGHTS OF HARMING ANYONE ELSE?: NO

## 2024-02-19 SDOH — SOCIAL STABILITY: SOCIAL INSECURITY: DO YOU FEEL UNSAFE GOING BACK TO THE PLACE WHERE YOU ARE LIVING?: NO

## 2024-02-19 SDOH — SOCIAL STABILITY: SOCIAL INSECURITY: ABUSE: ADULT

## 2024-02-19 SDOH — SOCIAL STABILITY: SOCIAL INSECURITY: DO YOU FEEL ANYONE HAS EXPLOITED OR TAKEN ADVANTAGE OF YOU FINANCIALLY OR OF YOUR PERSONAL PROPERTY?: NO

## 2024-02-19 SDOH — HEALTH STABILITY: MENTAL HEALTH: CURRENT SMOKER: 0

## 2024-02-19 SDOH — SOCIAL STABILITY: SOCIAL INSECURITY: DOES ANYONE TRY TO KEEP YOU FROM HAVING/CONTACTING OTHER FRIENDS OR DOING THINGS OUTSIDE YOUR HOME?: NO

## 2024-02-19 SDOH — SOCIAL STABILITY: SOCIAL INSECURITY: HAS ANYONE EVER THREATENED TO HURT YOUR FAMILY OR YOUR PETS?: NO

## 2024-02-19 ASSESSMENT — COGNITIVE AND FUNCTIONAL STATUS - GENERAL
HELP NEEDED FOR BATHING: A LITTLE
EATING MEALS: A LITTLE
MOVING TO AND FROM BED TO CHAIR: A LITTLE
MOBILITY SCORE: 16
STANDING UP FROM CHAIR USING ARMS: A LITTLE
DAILY ACTIVITIY SCORE: 18
CLIMB 3 TO 5 STEPS WITH RAILING: A LOT
STANDING UP FROM CHAIR USING ARMS: A LITTLE
PATIENT BASELINE BEDBOUND: NO
PERSONAL GROOMING: A LITTLE
MOBILITY SCORE: 18
MOVING FROM LYING ON BACK TO SITTING ON SIDE OF FLAT BED WITH BEDRAILS: A LITTLE
STANDING UP FROM CHAIR USING ARMS: A LITTLE
TURNING FROM BACK TO SIDE WHILE IN FLAT BAD: A LITTLE
DRESSING REGULAR UPPER BODY CLOTHING: A LITTLE
CLIMB 3 TO 5 STEPS WITH RAILING: A LOT
TURNING FROM BACK TO SIDE WHILE IN FLAT BAD: A LITTLE
TOILETING: A LITTLE
EATING MEALS: A LITTLE
HELP NEEDED FOR BATHING: A LITTLE
WALKING IN HOSPITAL ROOM: A LOT
MOVING FROM LYING ON BACK TO SITTING ON SIDE OF FLAT BED WITH BEDRAILS: A LITTLE
DRESSING REGULAR UPPER BODY CLOTHING: A LITTLE
DRESSING REGULAR LOWER BODY CLOTHING: A LITTLE
CLIMB 3 TO 5 STEPS WITH RAILING: A LITTLE
PERSONAL GROOMING: A LITTLE
MOVING FROM LYING ON BACK TO SITTING ON SIDE OF FLAT BED WITH BEDRAILS: A LITTLE
WALKING IN HOSPITAL ROOM: A LITTLE
WALKING IN HOSPITAL ROOM: A LOT
MOVING TO AND FROM BED TO CHAIR: A LITTLE
DRESSING REGULAR LOWER BODY CLOTHING: A LITTLE
DAILY ACTIVITIY SCORE: 18
MOBILITY SCORE: 16
TURNING FROM BACK TO SIDE WHILE IN FLAT BAD: A LITTLE
MOVING TO AND FROM BED TO CHAIR: A LITTLE
TOILETING: A LITTLE

## 2024-02-19 ASSESSMENT — PATIENT HEALTH QUESTIONNAIRE - PHQ9
2. FEELING DOWN, DEPRESSED OR HOPELESS: NOT AT ALL
1. LITTLE INTEREST OR PLEASURE IN DOING THINGS: NOT AT ALL
SUM OF ALL RESPONSES TO PHQ9 QUESTIONS 1 & 2: 0

## 2024-02-19 ASSESSMENT — ACTIVITIES OF DAILY LIVING (ADL)
JUDGMENT_ADEQUATE_SAFELY_COMPLETE_DAILY_ACTIVITIES: YES
TOILETING: INDEPENDENT
FEEDING YOURSELF: INDEPENDENT
ADEQUATE_TO_COMPLETE_ADL: YES
PATIENT'S MEMORY ADEQUATE TO SAFELY COMPLETE DAILY ACTIVITIES?: YES
WALKS IN HOME: INDEPENDENT
GROOMING: INDEPENDENT
HEARING - RIGHT EAR: FUNCTIONAL
BATHING: INDEPENDENT
DRESSING YOURSELF: INDEPENDENT
HEARING - LEFT EAR: FUNCTIONAL
LACK_OF_TRANSPORTATION: NO

## 2024-02-19 ASSESSMENT — PAIN - FUNCTIONAL ASSESSMENT
PAIN_FUNCTIONAL_ASSESSMENT: 0-10

## 2024-02-19 ASSESSMENT — PAIN SCALES - GENERAL
PAINLEVEL_OUTOF10: 6
PAINLEVEL_OUTOF10: 4
PAINLEVEL_OUTOF10: 4
PAINLEVEL_OUTOF10: 5 - MODERATE PAIN
PAINLEVEL_OUTOF10: 0 - NO PAIN
PAINLEVEL_OUTOF10: 5 - MODERATE PAIN
PAINLEVEL_OUTOF10: 7
PAINLEVEL_OUTOF10: 5 - MODERATE PAIN
PAINLEVEL_OUTOF10: 5 - MODERATE PAIN

## 2024-02-19 ASSESSMENT — LIFESTYLE VARIABLES
AUDIT-C TOTAL SCORE: 0
HOW MANY STANDARD DRINKS CONTAINING ALCOHOL DO YOU HAVE ON A TYPICAL DAY: PATIENT DOES NOT DRINK
PRESCIPTION_ABUSE_PAST_12_MONTHS: NO
AUDIT-C TOTAL SCORE: 0
HOW OFTEN DO YOU HAVE A DRINK CONTAINING ALCOHOL: NEVER
SKIP TO QUESTIONS 9-10: 1
HOW OFTEN DO YOU HAVE 6 OR MORE DRINKS ON ONE OCCASION: NEVER
SUBSTANCE_ABUSE_PAST_12_MONTHS: NO

## 2024-02-19 ASSESSMENT — COLUMBIA-SUICIDE SEVERITY RATING SCALE - C-SSRS
6. HAVE YOU EVER DONE ANYTHING, STARTED TO DO ANYTHING, OR PREPARED TO DO ANYTHING TO END YOUR LIFE?: NO
2. HAVE YOU ACTUALLY HAD ANY THOUGHTS OF KILLING YOURSELF?: NO
1. IN THE PAST MONTH, HAVE YOU WISHED YOU WERE DEAD OR WISHED YOU COULD GO TO SLEEP AND NOT WAKE UP?: NO

## 2024-02-19 NOTE — OP NOTE
Arthroplasty Total Hip Posterior Approach (R) Operative Note     Date: 2024  OR Location: [facility] OR    Name: John Casale, : 1955, Age: 68 y.o., MRN: [id number], Sex: male    Diagnosis  Pre-op Diagnosis     * Unilateral primary osteoarthritis, right hip [M16.11] Post-op Diagnosis     * Unilateral primary osteoarthritis, right hip [M16.11]     Procedures  Arthroplasty Total Hip Posterior Approach  62421 - WA ARTHRP ACETBLR/PROX FEM PROSTC AGRFT/ALGRFT      Surgeons      * Jer Logan - Primary    Resident/Fellow/Other Assistant:  Surgeon(s) and Role:    Procedure Summary  Anesthesia: Spinal  ASA: II  Anesthesia Staff: Anesthesiologist: Bebo Almanzar MD  C-AA: GORAN Orourke  Estimated Blood Loss: 350 ml  Intra-op Medications:   Administrations occurring from 1030 to 1315 on 24:   Medication Name Total Dose   sodium chloride 0.9 % irrigation solution 1,000 mL   lactated Ringer's infusion Cannot be calculated   ceFAZolin in dextrose (iso-os) (Ancef) IVPB 2 g 2 g              Anesthesia Record               Intraprocedure I/O Totals       None           Specimen: No specimens collected     Staff:   Circulator: Leobardo Hardy RN; Stephanie Wilkinson RN  Relief Circulator: Lisseth Watts RN  Scrub Person: Barbara Palomo; Molly Kay; [facility]         Drains and/or Catheters: * None in log *    Tourniquet Times:         Implants:  Implants       Type Name Action Serial No.      Joint Hip FEMORAL HEAD, CERAMIC 36 +1.5 - SN/A - [id number] Implanted N/A     Joint Hip STEM, ACTIS COLLAR, HIGH, SIZE 4 - SN/A - [id number] Implanted N/A     Joint Hip LINER, ALTRX, NEURTAL, 36 X 56MM - SN/A - [id number] Implanted N/A     Joint Hip ACETABULAR CUP, MULTI HOLE, SIZE 56MM - SN/A - [id number] Implanted N/A     Screw SCREW CANCELLOUS 6.5 X 25 - SN/A - [id number] Implanted N/A     Screw SCREW CANCELLOUS 6.5 X 25 - SN/A - [id number] Implanted N/A              SURGICAL IMPLANTS:  -DePuy  Farina multihole acetabular shell, 56 mm outer diameter with 25/25 mm screws  -DePuy Farina ALT-Rx polyethylene acetabular liner, 36 mm inner diameter, neutral obliquity  -DePuy Actis femoral stem, size 4, high offset, 12/14 taper  -DePuy Biolox ceramic head, 36 mm diameter, +1.5 mm neck length      OPERATIVE INDICATIONS:  The patient is a very pleasant 68 year-old male indicated for right total hip arthroplasty in the setting of primary osteoarthritis with associated pain and functional limitation. The patient has been experiencing significant pain and functional debility which have been refractory to a course of conservative management including activity modification, home flexibility and strengthening, nonsteroidal anti-inflammatories, nonopioid analgesics.  The patient has been experiencing difficulty with activities of daily living including ascending and descending stairs, performing self care, and standing or walking for prolonged periods. The risks, benefits, alternatives, and convalescence were discussed with the patient for right total hip arthroplasty. Risks discussed included, but were not limited to, infection, DVT, pulmonary embolism, hematoma formation, wound healing complications, intraoperative and postoperative fracture, postoperative instability including dislocation, apparent or real limb length discrepancy, cardiopulmonary complications including myocardial infarction and respiratory insufficiency, neurovascular complications including cerebrovascular accident and sciatic/femoral nerve palsy, osteolysis, component wear, aseptic or septic loosening of components, need for further surgery, acute and/or chronic postoperative pain, anesthetic complications, COVID-specific risk, and death. The patient verbalized understanding and agreed to proceed after participating in the process shared decision making. The patient's chart was reviewed for venous thromboembolic and cardiovascular risk factors  within 30 days of surgery.    MRSA/MSSA carrier status was negative preoperatively as per nasal swab.  The patient underwent routine nasal decolonization as per institutional protocol.    OPERATIVE TECHNIQUE:  The patient was identified in the preoperative holding area through direct interview, as well as review of chart materials and the patient's identification band. The correct surgical site (right hip) was marked with indelible ink following confirmation with the patient. The patient was transported operative theater following preoperative safety huddle.  General anesthetic was administered, following inability to administer spinal anesthetic.  No Celestin catheter was placed. Preoperative antibiotic prophylaxis consisted of Ancef 2 gram IV, which was administered within 30-60 minutes of skin incision.  It was confirmed that the patient had received oral tranexamic acid in the preoperative holding area.    The patient was positioned in lateral decubitus the operative hip facing the ceiling.  All bony prominences were padded and an axillary roll placed.  Position with secured with a peg board apparatus.  The operative extremity was prepped and draped in the usual sterile fashion.    Surgical timeout was performed to confirm patient identity, laterality of the procedure, availability of appropriate implants, and availability of pertinent imaging studies.  A posterolateral incision was based off the posterior third of the greater trochanter. Sharp dissection was carried down through the subcutaneous layer and all bleeders cauterized. The deep fascial layer was divided in line with its fibers and a Charnley bow retractor was placed under the fascial edges, taking care to palpate and protect the sciatic nerve throughout this process. The hip was internally rotated and a #2 retractor placed under the gluteus medius fibers. Piriformis tendon was identified and preserved.  The inferior fibers of the gluteus minimus were  cauterized and the #2 retractor was repositioned under the minimus. Trapezoidal capsulotomy was performed and the capsule was tagged with additional #5 Ethibond sutures. Accessible portions of the posterior acetabular labrum were excised this point.  The hip was dislocated with a combination of flexion, internal rotation, and adduction. A blunt retractor was placed adjacent to the lesser trochanter. Soft tissue was dissected from the posterior extent of the femoral neck and intertrochanteric region. Femoral neck osteotomy was marked with electrocautery at reference location 10 mm proximal to the lesser trochanter, based on preoperative templating.  A neck cutting guide was utilized to determine the trajectory of the osteotomy, which was then created utilizing a single-sided reciprocating saw. The femoral head was extracted and native diameter was measured at 52 mm.    Anterior and posterior acetabular retractors were positioned and an Oberhill retractor was placed between the gluteus medius fibers and the posterior capsular flap.  This afforded excellent visualization of the acetabulum. Remaining portions of the acetabular labrum were excised with electrocautery. The transverse acetabular ligament was retained for orientation purposes. Portions of the inferior capsule were cauterized to facilitate exposure. The cotyloid body was excised with electrocautery. Once we had obtained adequate exposure, reaming commenced with a 50 mm reamer which was medialized to within 0.5 mm of the base of the cotyloid fossa. Reaming then progressed sequentially with reamer maintained in 40° of abduction and 20-25° of anteversion, to a reamer size of 56 mm, which exposed a circumferential bed of bleeding cancellous bone. The acetabulum was inspected for residual cysts which were bone graft with cancellous allograft.  A 56 mm Holden GRIPTION multihole shell was impacted in 40° of abduction and 20-25° of anteversion. Appropriate shell  position was confirmed utilizing anatomic landmarks, including the transverse acetabular ligament and the anterior acetabular shelf. The acetabular shell was well approximated against native bone and was secured in position with 2 screws placed in the posterior superior acetabular quadrant.  A trial neutral liner was placed. Anterior, posterior, and inferior acetabular osteophytes were removed with a half-inch curved osteotome and mallet as necessary.    Attention was turned to the proximal femur which was exposed with a proximal femoral elevator and a blunt retractor adjacent to the lesser trochanter. Redundant soft tissue was removed adjacent to the greater trochanter. The box osteotome was passed, followed by the canal reamer and a rat tailed rasp. Broaching commenced with a starter broach, and progressed sequentially to a size 4 broach which provided excellent rotational stability and seated at a level equivalent to that of the femoral neck osteotomy. The broaches were maintained in 20-25° of anteversion throughout this process, consistent with the patient's native femoral anatomy. A standard offset neck trial was placed with a 36+1.5 mm head. Reduction was achieved with longitudinal traction and manual pressure using a head pusher. With the construct reduced, limb lengths were noted to be equivalent within 1-2 mm. Lateral soft tissue tension was lax and longitudinal laxity with shuck test was estimated at 4 mm. Trialing the hip in deep flexion, flexion to 90° with internal rotation to 60°, and external rotation revealed. Based on trialing, the construct was modified to a high offset neck trial which mitigated lateral soft tissue laxity and minimize shock to less than 2 mm.       Trial components were removed and the wound was irrigated with irrisept solution, then rinsed with sterile saline. The final 36 mm inner diameter acetabular liner with neutral obliquity was secured into the locking mechanism of the  acetabular shell. The final size 4 Actis femoral stem with lateralized offset was impacted and seated 2 mm proud as compared with the final broach.  We retrialed the construct with a 36+1.5 mm trial head and noted that limb lengths were equivalent within acceptable parameters and offset was appropriate.  A 36 mm diameter Biolox head with +1.5 mm neck length was impacted onto the trunnion. Final reduction was performed and we confirmed restoration of limb lengths and maintenance of stability throughout range of motion.    The wound was bathed with Irrisept, then rinsed with sterile saline. All members of the surgical team exchanged their outer gloves.  The piriformis tendon and posterior capsular flap were repaired to the insertion of the gluteus medius tendon on the greater trochanter, using the previously placed #5 Ethibond sutures which were now passed in horizontal mattress fashion using a free needle. The deep fascial layer reapproximated with #1 Vicryl placed in figure-of-eight fashion, then oversewn with a running #1 Stratofix suture. Deep subcutaneous and deep dermal layers were reapproximated with 2-0 Vicryl placed in inverted simple fashion. Skin was closed with 3-0 stratafix.  Exofin mesh was placed over the skin incision, infused with skin adhesive, and allowed to dry.  Mepilex dressing was placed over the incision. The patient was transferred to the hospital bed after placement of a hip abduction pillow, then subsequently transported to the  PACU in satisfactory condition.     All sponge and instrument counts were correct at the end of the case.     ASSISTANTS:  No qualified resident was available to assist with the case.    Barbara Palomo was utilized as first assistant on the case. Duties included, but were not limited to, positioning the patient for surgery, maintenance of retractors to facilitate exposure, assistance with positioning to facilitate final and trial reduction components, superficial  and deep layers of wound closure, and application of final dressing.    Molly Ansari was utilized as second assistant on the case. Duties included, but were not limited to, positioning the patient for surgery, maintenance of retractors to facilitate exposure, assistance with positioning to facilitate final and trial reduction components, superficial and deep layers of wound closure, and application of final dressing.     POSTOPERATIVE CARE:  The patient will be allowed to bear full weight as tolerated with a walker for assistance, no pivoting on the operative extremity.  The patient will be evaluated by physical and occupational therapy, and I anticipate that the patient will be a reasonable candidate for discharge home in 1-2 day(s).  For purposes of postoperative antibiotic prophylaxis, the patient will receive intravenous Ancef with weight-based dosing every 8 hours, total antibiotic duration not to exceed 23 hours.  Postoperative DVT chemoprophylaxis will consist of aspirin 81 mg by mouth twice daily, with first dose to be given this evening.    Jer Logan M.D.  Orthopedic Surgery      Jer Logan  Phone Number: [phone]

## 2024-02-19 NOTE — ANESTHESIA POSTPROCEDURE EVALUATION
Patient: John Casale    Procedure Summary       Date: 02/19/24 Room / Location: [facility] OR 06 / Virtual [facility] OR    Anesthesia Start: 1046 Anesthesia Stop: 1348    Procedure: Arthroplasty Total Hip Posterior Approach (Right: Hip) Diagnosis:       Unilateral primary osteoarthritis, right hip      (M16.11 - Unilateral primary osteoarthritis, right hip, M25.551 - Pain in right hip)    Surgeons: Jer Logan MD Responsible Provider: Bebo Almanzar MD    Anesthesia Type: general ASA Status: 2            Anesthesia Type: general    Vitals Value Taken Time   /63 02/19/24 1348   Temp 36.1 02/19/24 1348   Pulse 66 02/19/24 1348   Resp 16 02/19/24 1348   SpO2 99 02/19/24 1348       Anesthesia Post Evaluation    Patient location during evaluation: PACU  Patient participation: complete - patient participated  Level of consciousness: awake and alert  Pain management: satisfactory to patient  Airway patency: patent  Cardiovascular status: acceptable  Respiratory status: acceptable, face mask and spontaneous ventilation  Hydration status: acceptable  Postoperative Nausea and Vomiting: none        No notable events documented.

## 2024-02-19 NOTE — ANESTHESIA PREPROCEDURE EVALUATION
Patient: John Casale    Procedure Information       Date/Time: 02/19/24 1030    Procedure: Arthroplasty Total Hip Posterior Approach (Right: Hip)    Location: [facility] OR 06 / [facility]    Surgeons: Jer Logan MD            Relevant Problems   Musculoskeletal   (+) Primary osteoarthritis of right hip       Clinical information reviewed:   Tobacco  Allergies  Meds   Med Hx  Surg Hx   Fam Hx  Soc Hx        NPO Detail:  NPO/Void Status  Date of Last Liquid: 02/19/24  Time of Last Liquid: 0914  Date of Last Solid: 02/18/24  Time of Last Solid: 1800  Time of Last Void: 0530         Physical Exam    Airway  Mallampati: II  TM distance: >3 FB  Neck ROM: full     Cardiovascular - normal exam     Dental - normal exam     Pulmonary - normal exam     Abdominal - normal exam           Vitals:    02/19/24 0912   BP: 140/71   Pulse: 70   Resp: 16   Temp: 36.8 °C (98.2 °F)   SpO2: 96%       Past Surgical History:   Procedure Laterality Date    CATARACT EXTRACTION      COLONOSCOPY      HIP ARTHROPLASTY      OTHER SURGICAL HISTORY      sigmoid colectomy with colostomyand reversal    TONSILLECTOMY      UMBILICAL HERNIA REPAIR      UPPER GASTROINTESTINAL ENDOSCOPY       Past Medical History:   Diagnosis Date    Arthritis     Asthma     Cataract     Diabetes mellitus (CMS/HCC)     Diverticulosis     GERD (gastroesophageal reflux disease)     Hyperlipidemia     Hypertension     Liver abscess     Lumbar spondylosis     Palpitations     Sleep apnea     cpap    Spinal stenosis        Current Facility-Administered Medications:     ceFAZolin in dextrose (iso-os) (Ancef) IVPB 2 g, 2 g, intravenous, Once, Jer Logan MD    lactated Ringer's infusion, 100 mL/hr, intravenous, Continuous, Jer Logan MD  Prior to Admission medications    Medication Sig Start Date End Date Taking? Authorizing Provider   atenolol (Tenormin) 25 mg tablet Take 1 tablet (25 mg) by mouth once daily.   Yes Historical Provider, MD    atorvastatin (Lipitor) 20 mg tablet Take 1 tablet (20 mg) by mouth once daily at bedtime.   Yes Historical Provider, MD   b complex 0.4 mg tablet Take 1 tablet by mouth once daily.   Yes Historical Provider, MD   chlorhexidine (Peridex) 0.12 % solution Swish and spit 15 ml for 2 doses, 15mL the night before surgery and 15 ml morning of surgery - swish for 30 seconds -DO NOT SWALLOW, SPIT OUT 2/9/24  Yes ÁLVARO Quiñones-CNP   cycloSPORINE (Restasis) 0.05 % ophthalmic emulsion Administer 1 drop into both eyes 2 times a day.   Yes Historical Provider, MD   esomeprazole (NexIUM) 20 mg DR capsule Take 1 capsule (20 mg) by mouth once daily in the morning. Take before meals. Do not open capsule.   Yes Historical Provider, MD   fexofenadine (Allegra) 180 mg tablet Take 1 tablet (180 mg) by mouth once daily as needed (allergies).   Yes Historical Provider, MD   fluticasone propion-salmeteroL (Advair Diskus) 250-50 mcg/dose diskus inhaler Inhale 1 puff 2 times a day. Rinse mouth with water after use to reduce aftertaste and incidence of candidiasis. Do not swallow.   Yes Historical Provider, MD   lactobacillus acidophilus tablet tablet Take 1 tablet by mouth once daily.   Yes Historical Provider, MD   metFORMIN  mg 24 hr tablet Take 1 tablet (500 mg) by mouth once daily in the evening. Take with meals. Do not crush, chew, or split.   Yes Historical Provider, MD   montelukast (Singulair) 10 mg tablet Take 1 tablet (10 mg) by mouth once daily at bedtime.   Yes Historical Provider, MD   multivitamin tablet Take 1 tablet by mouth once daily.   Yes Historical Provider, MD   pregabalin (Lyrica) 100 mg capsule Take 1 capsule (100 mg) by mouth 2 times a day.   Yes Historical Provider, MD   zinc gluconate 50 mg tablet Take 1 tablet (50 mg of elemental zinc) by mouth once daily.   Yes Historical Provider, MD   ascorbic acid (Vitamin C) 500 mg tablet Take 1 tablet (500 mg) by mouth once daily.    Historical Provider, MD  "  cholecalciferol (Vitamin D-3) 25 MCG (1000 UT) tablet Take 1 tablet (1,000 Units) by mouth once daily.    Historical Provider, MD     Allergies   Allergen Reactions    Codeine GI Upset     Social History     Tobacco Use    Smoking status: Never     Passive exposure: Past    Smokeless tobacco: Never   Substance Use Topics    Alcohol use: Yes     Comment: Occassional         Chemistry    Lab Results   Component Value Date/Time     02/09/2024 1513    K 4.3 02/09/2024 1513     02/09/2024 1513    CO2 27 02/09/2024 1513    BUN 24 (H) 02/09/2024 1513    CREATININE 0.69 02/09/2024 1513    Lab Results   Component Value Date/Time    CALCIUM 9.5 02/09/2024 1513          Lab Results   Component Value Date/Time    WBC 10.3 02/09/2024 1513    HGB 14.1 02/09/2024 1513    HCT 43.1 02/09/2024 1513     02/09/2024 1513     No results found for: \"PROTIME\", \"PTT\", \"INR\"  Encounter Date: 02/09/24   ECG 12 Lead   Result Value    Ventricular Rate 87    Atrial Rate 87    AK Interval 148    QRS Duration 84    QT Interval 354    QTC Calculation(Bazett) 425    P Axis 54    R Axis -42    T Axis 3    QRS Count 14    Q Onset 213    P Onset 139    P Offset 189    T Offset 390    QTC Fredericia 400    Narrative    Normal sinus rhythm  Left anterior fascicular block  Abnormal ECG  No previous ECGs available  Confirmed by Garrett Alfred ([id number]) on 2/11/2024 5:34:16 PM        Anesthesia Plan    History of general anesthesia?: yes  History of complications of general anesthesia?: no    ASA 2     general     The patient is not a current smoker.    intravenous induction   Anesthetic plan and risks discussed with patient.    Plan discussed with CAA.      "

## 2024-02-19 NOTE — ANESTHESIA PROCEDURE NOTES
Airway  Date/Time: 2/19/2024 11:14 AM  Urgency: elective    Airway not difficult    Staffing  Performed: GORAN   Authorized by: Bebo Almanzar MD    Performed by: GORAN Orourke  Patient location during procedure: OR    Indications and Patient Condition  Indications for airway management: anesthesia  Spontaneous Ventilation: absent  Sedation level: deep  Preoxygenated: yes  Patient position: sniffing  MILS maintained throughout  Mask difficulty assessment: 1 - vent by mask    Final Airway Details  Final airway type: endotracheal airway      Successful airway: ETT  Cuffed: yes   Successful intubation technique: direct laryngoscopy  Endotracheal tube insertion site: oral  Blade: Dileep  Blade size: #4  ETT size (mm): 7.5  Cormack-Lehane Classification: grade IIb - view of arytenoids or posterior of glottis only  Placement verified by: chest auscultation and capnometry   Cuff volume (mL): 6  Measured from: lips  ETT to lips (cm): 22  Number of attempts at approach: 1

## 2024-02-19 NOTE — NURSING NOTE
This pt remained stable since admission to the unit. VSS. Pain managed with oxy. IVF infusing as ordered. Dressing is CDI. Safety maintained with nonskid footwear and use of call light. Bed alarms on. No questions or concerns. Call light is in reach. Neurovascular assessments stable.

## 2024-02-19 NOTE — ANESTHESIA PROCEDURE NOTES
Peripheral IV  Date/Time: 2/19/2024 12:00 PM  Inserted by: GORAN Orourke    Placement  Needle size: 18 G  Laterality: right  Location: hand  Local anesthetic: none  Site prep: alcohol  Technique: anatomical landmarks  Attempts: 1

## 2024-02-20 VITALS
DIASTOLIC BLOOD PRESSURE: 60 MMHG | WEIGHT: 190 LBS | HEART RATE: 70 BPM | SYSTOLIC BLOOD PRESSURE: 110 MMHG | BODY MASS INDEX: 27.2 KG/M2 | RESPIRATION RATE: 17 BRPM | HEIGHT: 70 IN | TEMPERATURE: 98.4 F | OXYGEN SATURATION: 96 %

## 2024-02-20 PROBLEM — D62 ACUTE POSTOPERATIVE ANEMIA DUE TO EXPECTED BLOOD LOSS: Status: ACTIVE | Noted: 2024-02-20

## 2024-02-20 LAB
ANION GAP SERPL CALC-SCNC: 12 MMOL/L (ref 10–20)
BUN SERPL-MCNC: 19 MG/DL (ref 6–23)
CALCIUM SERPL-MCNC: 8.3 MG/DL (ref 8.6–10.3)
CHLORIDE SERPL-SCNC: 101 MMOL/L (ref 98–107)
CO2 SERPL-SCNC: 27 MMOL/L (ref 21–32)
CREAT SERPL-MCNC: 0.71 MG/DL (ref 0.5–1.3)
EGFRCR SERPLBLD CKD-EPI 2021: >90 ML/MIN/1.73M*2
ERYTHROCYTE [DISTWIDTH] IN BLOOD BY AUTOMATED COUNT: 13.2 % (ref 11.5–14.5)
GLUCOSE BLD MANUAL STRIP-MCNC: 227 MG/DL (ref 74–99)
GLUCOSE BLD MANUAL STRIP-MCNC: 228 MG/DL (ref 74–99)
GLUCOSE SERPL-MCNC: 226 MG/DL (ref 74–99)
HCT VFR BLD AUTO: 29.7 % (ref 41–52)
HGB BLD-MCNC: 10 G/DL (ref 13.5–17.5)
MCH RBC QN AUTO: 31.2 PG (ref 26–34)
MCHC RBC AUTO-ENTMCNC: 33.7 G/DL (ref 32–36)
MCV RBC AUTO: 93 FL (ref 80–100)
NRBC BLD-RTO: 0 /100 WBCS (ref 0–0)
PLATELET # BLD AUTO: 222 X10*3/UL (ref 150–450)
POTASSIUM SERPL-SCNC: 3.9 MMOL/L (ref 3.5–5.3)
RBC # BLD AUTO: 3.21 X10*6/UL (ref 4.5–5.9)
SODIUM SERPL-SCNC: 136 MMOL/L (ref 136–145)
WBC # BLD AUTO: 10.7 X10*3/UL (ref 4.4–11.3)

## 2024-02-20 PROCEDURE — 82374 ASSAY BLOOD CARBON DIOXIDE: CPT | Performed by: ORTHOPAEDIC SURGERY

## 2024-02-20 PROCEDURE — 97116 GAIT TRAINING THERAPY: CPT | Mod: GP,CQ

## 2024-02-20 PROCEDURE — 2500000004 HC RX 250 GENERAL PHARMACY W/ HCPCS (ALT 636 FOR OP/ED): Performed by: ORTHOPAEDIC SURGERY

## 2024-02-20 PROCEDURE — 7100000011 HC EXTENDED STAY RECOVERY HOURLY - NURSING UNIT

## 2024-02-20 PROCEDURE — 99231 SBSQ HOSP IP/OBS SF/LOW 25: CPT | Performed by: PHYSICIAN ASSISTANT

## 2024-02-20 PROCEDURE — 82947 ASSAY GLUCOSE BLOOD QUANT: CPT

## 2024-02-20 PROCEDURE — 2500000001 HC RX 250 WO HCPCS SELF ADMINISTERED DRUGS (ALT 637 FOR MEDICARE OP): Performed by: ORTHOPAEDIC SURGERY

## 2024-02-20 PROCEDURE — 97110 THERAPEUTIC EXERCISES: CPT | Mod: GP,CQ

## 2024-02-20 PROCEDURE — 36415 COLL VENOUS BLD VENIPUNCTURE: CPT | Performed by: ORTHOPAEDIC SURGERY

## 2024-02-20 PROCEDURE — 85027 COMPLETE CBC AUTOMATED: CPT | Performed by: ORTHOPAEDIC SURGERY

## 2024-02-20 PROCEDURE — 2500000002 HC RX 250 W HCPCS SELF ADMINISTERED DRUGS (ALT 637 FOR MEDICARE OP, ALT 636 FOR OP/ED): Performed by: ORTHOPAEDIC SURGERY

## 2024-02-20 PROCEDURE — 94640 AIRWAY INHALATION TREATMENT: CPT

## 2024-02-20 PROCEDURE — 97165 OT EVAL LOW COMPLEX 30 MIN: CPT | Mod: GO | Performed by: OCCUPATIONAL THERAPIST

## 2024-02-20 RX ORDER — AMOXICILLIN 250 MG
1 CAPSULE ORAL DAILY
Qty: 30 TABLET | Refills: 0 | Status: SHIPPED | OUTPATIENT
Start: 2024-02-20 | End: 2024-03-21

## 2024-02-20 RX ORDER — ACETAMINOPHEN 325 MG/1
650 TABLET ORAL EVERY 6 HOURS SCHEDULED
Qty: 240 TABLET | Refills: 0 | Status: SHIPPED | OUTPATIENT
Start: 2024-02-20 | End: 2024-03-21

## 2024-02-20 RX ORDER — OXYCODONE HYDROCHLORIDE 5 MG/1
5 TABLET ORAL EVERY 6 HOURS PRN
Qty: 28 TABLET | Refills: 0 | Status: SHIPPED | OUTPATIENT
Start: 2024-02-20 | End: 2024-02-27

## 2024-02-20 RX ORDER — CELECOXIB 200 MG/1
200 CAPSULE ORAL DAILY
Qty: 30 CAPSULE | Refills: 0 | Status: SHIPPED | OUTPATIENT
Start: 2024-02-20 | End: 2024-03-21

## 2024-02-20 RX ORDER — SULFAMETHOXAZOLE AND TRIMETHOPRIM 800; 160 MG/1; MG/1
1 TABLET ORAL 2 TIMES DAILY
Qty: 14 TABLET | Refills: 0 | Status: SHIPPED | OUTPATIENT
Start: 2024-02-20 | End: 2024-02-27

## 2024-02-20 RX ORDER — ASPIRIN 81 MG/1
81 TABLET ORAL 2 TIMES DAILY
Qty: 60 TABLET | Refills: 0 | Status: SHIPPED | OUTPATIENT
Start: 2024-02-20 | End: 2024-03-21

## 2024-02-20 RX ADMIN — PREGABALIN 100 MG: 100 CAPSULE ORAL at 08:40

## 2024-02-20 RX ADMIN — FORMOTEROL FUMARATE 20 MCG: 20 SOLUTION RESPIRATORY (INHALATION) at 08:49

## 2024-02-20 RX ADMIN — CELECOXIB 200 MG: 200 CAPSULE ORAL at 08:40

## 2024-02-20 RX ADMIN — CEFAZOLIN SODIUM 2 G: 2 INJECTION, SOLUTION INTRAVENOUS at 03:55

## 2024-02-20 RX ADMIN — OXYCODONE HYDROCHLORIDE 5 MG: 5 TABLET ORAL at 08:39

## 2024-02-20 RX ADMIN — PANTOPRAZOLE SODIUM 20 MG: 20 TABLET, DELAYED RELEASE ORAL at 05:49

## 2024-02-20 RX ADMIN — BUDESONIDE INHALATION 0.5 MG: 0.5 SUSPENSION RESPIRATORY (INHALATION) at 08:49

## 2024-02-20 RX ADMIN — DIPHENHYDRAMINE HYDROCHLORIDE 12.5 MG: 50 INJECTION, SOLUTION INTRAMUSCULAR; INTRAVENOUS at 05:48

## 2024-02-20 RX ADMIN — ATENOLOL 25 MG: 25 TABLET ORAL at 08:39

## 2024-02-20 RX ADMIN — POLYVINYL ALCOHOL, POVIDONE 1 DROP: 14; 6 SOLUTION/ DROPS OPHTHALMIC at 08:40

## 2024-02-20 RX ADMIN — SODIUM CHLORIDE, POTASSIUM CHLORIDE, SODIUM LACTATE AND CALCIUM CHLORIDE 125 ML/HR: 600; 310; 30; 20 INJECTION, SOLUTION INTRAVENOUS at 00:25

## 2024-02-20 RX ADMIN — LORATADINE 10 MG: 10 TABLET ORAL at 08:39

## 2024-02-20 RX ADMIN — DEXAMETHASONE SODIUM PHOSPHATE 10 MG: 4 INJECTION, SOLUTION INTRAMUSCULAR; INTRAVENOUS at 05:49

## 2024-02-20 RX ADMIN — ACETAMINOPHEN 975 MG: 325 TABLET ORAL at 14:55

## 2024-02-20 RX ADMIN — ASPIRIN 81 MG: 81 TABLET, COATED ORAL at 09:00

## 2024-02-20 RX ADMIN — ACETAMINOPHEN 975 MG: 325 TABLET ORAL at 05:49

## 2024-02-20 RX ADMIN — OXYCODONE HYDROCHLORIDE AND ACETAMINOPHEN 500 MG: 500 TABLET ORAL at 09:00

## 2024-02-20 RX ADMIN — OXYCODONE HYDROCHLORIDE 10 MG: 10 TABLET ORAL at 00:28

## 2024-02-20 RX ADMIN — SENNOSIDES AND DOCUSATE SODIUM 1 TABLET: 8.6; 5 TABLET ORAL at 08:39

## 2024-02-20 ASSESSMENT — COGNITIVE AND FUNCTIONAL STATUS - GENERAL
MOBILITY SCORE: 22
STANDING UP FROM CHAIR USING ARMS: A LITTLE
MOBILITY SCORE: 21
HELP NEEDED FOR BATHING: A LITTLE
DAILY ACTIVITIY SCORE: 20
PERSONAL GROOMING: A LITTLE
MOVING TO AND FROM BED TO CHAIR: A LITTLE
DRESSING REGULAR LOWER BODY CLOTHING: A LITTLE
CLIMB 3 TO 5 STEPS WITH RAILING: A LITTLE
CLIMB 3 TO 5 STEPS WITH RAILING: A LITTLE
TOILETING: A LITTLE
WALKING IN HOSPITAL ROOM: A LITTLE

## 2024-02-20 ASSESSMENT — PAIN SCALES - GENERAL
PAINLEVEL_OUTOF10: 4
PAINLEVEL_OUTOF10: 5 - MODERATE PAIN
PAINLEVEL_OUTOF10: 5 - MODERATE PAIN
PAINLEVEL_OUTOF10: 7
PAINLEVEL_OUTOF10: 4

## 2024-02-20 ASSESSMENT — PAIN - FUNCTIONAL ASSESSMENT
PAIN_FUNCTIONAL_ASSESSMENT: 0-10

## 2024-02-20 NOTE — PROGRESS NOTES
02/20/24 1034   Discharge Planning   Living Arrangements Spouse/significant other   Support Systems Spouse/significant other   Type of Residence Private residence   Home or Post Acute Services In home services   Type of Home Care Services Home OT;Home PT   Patient expects to be discharged to: Home with [facility]   Does the patient need discharge transport arranged? No     Met with patient at bedside. Admitted for right total knee replacement. Pt lives with wife and son and was independent PTA with no HHC. Pt has a walker. Pt was able to drive and obtain medications. PT [facility] 16. Pt plans to return home with [facility] PT/OT. Number provided, and patient instructed to call [facility] when he arrives home. Family will provide transport.

## 2024-02-20 NOTE — DISCHARGE INSTRUCTIONS
ORTHOPEDIC SURGERY DISCHARGE INSTRUCTIONS:    Attending Orthopaedic Surgeon: Jer Logan MD    Principal diagnosis: Primary osteoarthritis of right hip    Other diagnoses:  Acute postoperative pain, acute postoperative anemia due to surgical blood loss, type 2 diabetes mellitus    Procedure(s) Performed:  -Right total hip replacement on 2/19    Activity Instructions:  -Full weightbearing as tolerated, with walker or cane as needed for assistance at all times with standing or walking.  -No squatting, kneeling, or crouching.  -No pivoting on right foot/ankle, such as for transfers from bed to chair.  -Keep either foam hip wedge or 2-3 pillows between legs while lying in bed to prevent scissoring of the thighs.  -No driving until on or after 3/11.    Diet:  -Resume usual diet.  -Recommend taking a protein supplement (for example, [place] Instant Breakfast, Boost, Ensure; Glucerna if diabetic) one serving 3 times daily to decrease swelling and promote incisional healing.    Dressing Care:  -Mepilex dressing to remain in place on right hip until follow up appointment with Dr. Logan.  -May shower or sponge bathe with Mepilex dressing in place, then pat dry with towel.  Recommend using shower bench/chair.  -No tub bathing.  -Do not apply lotions, oils, ointments, creams, or powders near dressing.    Medications after Discharge:  -IMPORTANT: On the morning of 2/21, start taking SULFAMETHOXAZOLE-TRIMETHOPRIM (BACTRIM DS) 800/160 mg tablet by mouth EVERY 12 HOURS for prevention of surgical site infection.  Continue on regular schedule for 7 days.  -IMPORTANT: Take ASPIRIN 81 mg tablet by mouth TWICE DAILY for prevention of blood clots (thromboembolism).  Continue on a regular schedule for 30 days.    -Take celecoxib (Celebrex) 200 mg capsule by mouth with food once daily to control inflammation/swelling.  Continue on a regular schedule for 30 days.    -Please refer to medicine reconciliation form for additional  medication instructions.    Other Instructions:  -May apply ice pack to right hip and thigh for 20 minutes every 4 hours while awake, as needed to control inflammation/swelling.    Follow-up with Orthopaedic Surgery:  -You will need to be seen by Dr. Logan, either in office or via video telehealth visit, between 2/26 and 2/29 right total hip replacement for postoperative assessment and incision check following ***.  Please call [phone] within 2-3 days following discharge from the hospital to schedule, confirm, or modify this appointment.    Jer Logan MD  Orthopaedic Surgery, Specialist in Total Hip and Knee Replacement and Revision    [facility]  -[address] [place]  [phone] [place]  [address]

## 2024-02-20 NOTE — PROGRESS NOTES
Physical Therapy    Physical Therapy    Physical Therapy Treatment    Patient Name: John Casale  MRN: [id number]  Today's Date: 2/20/2024  Time Calculation  Start Time: 1302  Stop Time: 1330  Time Calculation (min): 28 min       Assessment/Plan   PT Assessment  PT Assessment Results: Decreased strength, Decreased endurance, Decreased mobility, Decreased coordination, Pain  Rehab Prognosis: Good  End of Session Communication: Bedside nurse  End of Session Patient Position: Up in chair, Alarm on  PT Plan  Inpatient/Swing Bed or Outpatient: Inpatient  PT Plan  Treatment/Interventions: Bed mobility, Transfer training, Gait training, Stair training, Strengthening, Endurance training, Range of motion, Therapeutic exercise, Therapeutic activity, Home exercise program (Instructed wife in a/p, qs, and gs exercises. asked her to start them with pt., after he wakes up.  Handout issued.)  PT Plan:  (see till disch'd., then expect HHC/PT.)  PT Frequency: BID  PT Discharge Recommendations: Low intensity level of continued care  Equipment Recommended upon Discharge: Wheeled walker, Straight cane  PT Recommended Transfer Status: Assist x1  PT - OK to Discharge: Yes (when doctor allows.)     02/20/24 1302   PT  Visit   PT Received On 02/20/24   Response to Previous Treatment Patient with no complaints from previous session.   General   Reason for Referral R THR therex gait aand transfer training   Referred By Jer Logan MD   Pain Assessment   Pain Assessment 0-10   Pain Score 4   Pain Location Hip   Pain Orientation Right   Cognition   Overall Cognitive Status WFL   Orientation Level Oriented X4   Therapeutic Exercise   Therapeutic Exercise Performed Yes   Therapeutic Exercise Activity 1 AP,  QS,  GS,  Hip Abduction,  Marchin,  LAQ,  Ball Squeezes  x 20 B with emphasis on R LE   Bed Mobility   Bed Mobility Yes   Bed Mobility 1   Bed Mobility 1 Supine to sitting;Sitting to supine   Level of Assistance 1 Contact guard    Ambulation/Gait Training   Ambulation/Gait Training Performed Yes   Ambulation/Gait Training 1   Surface 1 Level tile   Device 1 Rolling walker   Gait Support Devices Gait belt   Assistance 1 Close supervision   Comments/Distance (ft) 1 300   Transfers   Transfer Yes   Transfer 1   Technique 1 Sit to stand;Stand to sit   Transfer Device 1 Walker   Transfer Level of Assistance 1 Contact guard   Transfers 2   Technique 2 Sit to stand;Stand to sit   Transfer Device 2 Walker   Transfer Level of Assistance 2 Close supervision   Stairs   Stairs Yes   Stairs   Rails 1 Right   Curb Step 1 Yes   Device 1 Single point cane   Support Devices 1 Gait belt   Assistance 1 Contact guard   Comment/Number of Steps 1 3   PT Assessment   PT Assessment Results Decreased strength;Decreased endurance;Decreased mobility;Decreased coordination;Pain   Rehab Prognosis Good   End of Session Communication Bedside nurse   End of Session Patient Position Up in chair;Alarm on   PT Plan   Inpatient/Swing Bed or Outpatient Inpatient     Outcome Measures:  [facility] Basic Mobility  Turning from your back to your side while in a flat bed without using bedrails: None  Moving from lying on your back to sitting on the side of a flat bed without using bedrails: None  Moving to and from bed to chair (including a wheelchair): None  Standing up from a chair using your arms (e.g. wheelchair or bedside chair): None  To walk in hospital room: A little  Climbing 3-5 steps with railing: A little  Basic Mobility - Total Score: 22                             EDUCATION:     Education Documentation  No documentation found.  Education Comments  No comments found.        GOALS:  Encounter Problems       Encounter Problems (Active)       Mobility       STG - Patient will ambulate x 250 ft. with w/w, Modif. Indep..  (Progressing)       Start:  02/19/24    Expected End:  03/04/24            STG - Patient will ascend and descend four to six stairs with a railing and a cane,  or CGA with railing.   (Progressing)       Start:  02/19/24    Expected End:  03/04/24            Goal 1:  Demo 4/5=R quads., and a Good SLR.   (Progressing)       Start:  02/19/24    Expected End:  03/04/24               Pain - Adult          Safety       STG - Patient uses gait belt during all transfers and w/w amb. trials.   (Progressing)       Start:  02/19/24    Expected End:  03/04/24               Transfers       STG - Patient will perform bed mobility with Modif. Indep. skill.   (Progressing)       Start:  02/19/24    Expected End:  03/04/24            STG - Patient will transfer sit to and from stand into a w/w, with Modif. Indep. skill.   (Progressing)       Start:  02/19/24    Expected End:  03/04/24

## 2024-02-20 NOTE — PROGRESS NOTES
Physical Therapy    Physical Therapy    Physical Therapy Treatment    Patient Name: John Casale  MRN: [id number]  Today's Date: 2/20/2024  Time Calculation  Start Time: 0922  Stop Time: 1000  Time Calculation (min): 38 min       Assessment/Plan   PT Assessment  PT Assessment Results: Decreased strength, Decreased endurance, Decreased mobility, Decreased coordination, Pain  Rehab Prognosis: Good  End of Session Communication: Bedside nurse  End of Session Patient Position: Up in chair, Alarm on  PT Plan  Inpatient/Swing Bed or Outpatient: Inpatient  PT Plan  Treatment/Interventions: Bed mobility, Transfer training, Gait training, Stair training, Strengthening, Endurance training, Range of motion, Therapeutic exercise, Therapeutic activity, Home exercise program (Instructed wife in a/p, qs, and gs exercises. asked her to start them with pt., after he wakes up.  Handout issued.)  PT Plan:  (see till disch'd., then expect HHC/PT.)  PT Frequency: BID  PT Discharge Recommendations: Low intensity level of continued care  Equipment Recommended upon Discharge: Wheeled walker, Straight cane  PT Recommended Transfer Status: Assist x1  PT - OK to Discharge: Yes (when doctor allows.)     02/20/24 0922   PT  Visit   PT Received On 02/20/24   Response to Previous Treatment Patient with no complaints from previous session.   General   Reason for Referral R THR therex gait aand transfer training   Referred By Jer Logan MD   Pain Assessment   Pain Assessment 0-10   Pain Score 5 - Moderate pain   Pain Type Surgical pain   Pain Location Hip   Pain Orientation Right   Cognition   Overall Cognitive Status WFL   Orientation Level Oriented X4   Therapeutic Exercise   Therapeutic Exercise Performed Yes   Therapeutic Exercise Activity 1 AP,  QS,  GS,  Hip Abduction,  Marchin,  LAQ,  Ball Squeezes  x 20 B with emphasis on R LE   Bed Mobility   Bed Mobility Yes   Bed Mobility 1   Bed Mobility 1 Supine to sitting;Sitting to supine    Level of Assistance 1 Contact guard   Bed Mobility Comments 1 Instructed in use of gait belt as leg    Ambulation/Gait Training   Ambulation/Gait Training Performed Yes   Ambulation/Gait Training 1   Surface 1 Level tile   Device 1 Rolling walker   Gait Support Devices Gait belt   Assistance 1 Close supervision   Comments/Distance (ft) 1 300  (Patient is able to complete reciprocating steps with good heel strike toe off pattern)   Transfers   Transfer Yes   Transfer 1   Technique 1 Sit to stand;Stand to sit   Transfer Device 1 Walker   Transfer Level of Assistance 1 Contact guard   Trials/Comments 1 x3   Transfers 2   Technique 2 Sit to stand;Stand to sit   Transfer Device 2 Walker   Transfer Level of Assistance 2 Close supervision   Stairs   Stairs Yes   Stairs   Rails 1 Right   Curb Step 1 Yes   Device 1 Single point cane   Support Devices 1 Gait belt   Assistance 1 Contact guard   Comment/Number of Steps 1 3 x 2  (Patient using walker for 1 platform step then R rail and cane on L for 2 additional steps)   PT Assessment   PT Assessment Results Decreased strength;Decreased endurance;Decreased mobility;Decreased coordination;Pain   Rehab Prognosis Good   End of Session Communication Bedside nurse   End of Session Patient Position Up in chair;Alarm on   PT Plan   Inpatient/Swing Bed or Outpatient Inpatient     Outcome Measures:  [facility] Basic Mobility  Turning from your back to your side while in a flat bed without using bedrails: None  Moving from lying on your back to sitting on the side of a flat bed without using bedrails: None  Moving to and from bed to chair (including a wheelchair): A little  Standing up from a chair using your arms (e.g. wheelchair or bedside chair): A little  To walk in hospital room: None  Climbing 3-5 steps with railing: A little  Basic Mobility - Total Score: 21                             EDUCATION:     Education Documentation  No documentation found.  Education Comments  No  comments found.        GOALS:  Encounter Problems       Encounter Problems (Active)       Mobility       STG - Patient will ambulate x 250 ft. with w/w, Modif. Indep..  (Progressing)       Start:  02/19/24    Expected End:  03/04/24            STG - Patient will ascend and descend four to six stairs with a railing and a cane, or CGA with railing.   (Progressing)       Start:  02/19/24    Expected End:  03/04/24            Goal 1:  Demo 4/5=R quads., and a Good SLR.   (Progressing)       Start:  02/19/24    Expected End:  03/04/24               Pain - Adult          Safety       STG - Patient uses gait belt during all transfers and w/w amb. trials.   (Progressing)       Start:  02/19/24    Expected End:  03/04/24               Transfers       STG - Patient will perform bed mobility with Modif. Indep. skill.   (Progressing)       Start:  02/19/24    Expected End:  03/04/24            STG - Patient will transfer sit to and from stand into a w/w, with Modif. Indep. skill.   (Progressing)       Start:  02/19/24    Expected End:  03/04/24

## 2024-02-20 NOTE — PROGRESS NOTES
Occupational Therapy    Evaluation    Patient Name: John Casale  MRN: [id number]  Today's Date: 2/20/2024  Time Calculation  Start Time: 0951  Stop Time: 1012  Time Calculation (min): 21 min  Eval only     Assessment  IP OT Assessment  Prognosis: Good  End of Session Communication: Bedside nurse  End of Session Patient Position: Up in chair, Alarm on  Patient presents with decline in ADLs, functional transfers, functional mobility and would benefit from OT during acute stay to improve functional independence and safety. Recommend low intensity OT to maximize functional independence and safety.     Plan:  Treatment Interventions: ADL retraining, Functional transfer training, Patient/family training, Equipment evaluation/education, Compensatory technique education  OT Frequency: Daily  OT Discharge Recommendations: Low intensity level of continued care  Equipment Recommended upon Discharge: Wheeled walker (shower chair, reacher, sock aide, LH shoe horn)  OT - OK to Discharge: Yes from acute care OT services to the next level of care when cleared by medical team      Subjective   Current Problem:  1. Primary osteoarthritis of right hip            General:  General  Reason for Referral: right IMELDA  Referred By: Jer Logan MD  Past Medical History Relevant to Rehab: Admitted 2/19/2024 after having right IMELDA completed by Dr Logan.  PMH: HLD, HTN, DM, lumbar spondylosis, umbilical hernia repair  Prior to Session Communication: Bedside nurse  Patient Position Received: Up in chair  Preferred Learning Style: verbal  General Comment: Patient seated in bedside chair and agreeable to participate in OT evaluation.    Precautions:  LE Weight Bearing Status: Weight Bearing as Tolerated  Medical Precautions: Fall precautions  Post-Surgical Precautions:  (no pivoting, walker at all times)    Pain:  Pain Assessment  Pain Assessment: 0-10  Pain Score: 5 - Moderate pain  Pain Type: Surgical pain  Pain Location: Hip  Pain  Orientation: Right  Pain Interventions: Rest    Objective   Cognition:  Overall Cognitive Status: Within Functional Limits    Home Living:  Home Living Comments: Lives at home with spouse with 3 GIULIANA. Has WIS with [facility]     Prior Function:  Prior Function Comments: Was independent with all ADLs    ADL:  UE Dressing Assistance: Independent  LE Dressing Assistance: Minimal (don underwear and pants with use of AE)  ADL Comments: Educated patient on safety and comp strategies for lower body dressing    Activity Tolerance:  Endurance: Endurance does not limit participation in activity    Bed Mobility/Transfers:      Transfers  Transfer:  (SBA sit <>stand)  Educated patient on safety with car transfers and patient verbalized understanding.    Educated patient on safety and use of shower chair for safety and patient verbalized understanding. Educated patient on having [facility] assess shower safety prior to completing first shower and patient verbalized understanding.     Extremities: RUE   RUE : Within Functional Limits and LUE   LUE: Within Functional Limits    Outcome Measures: [facility] Daily Activity  Putting on and taking off regular lower body clothing: A little  Bathing (including washing, rinsing, drying): A little  Putting on and taking off regular upper body clothing: None  Toileting, which includes using toilet, bedpan or urinal: A little  Taking care of personal grooming such as brushing teeth: A little  Eating Meals: None  Daily Activity - Total Score: 20    EDUCATION:  Education  Individual(s) Educated: Patient  Education Provided: Fall precautons (safety with LB dressing, safety with shower transfers and car transfers)  Patient Response to Education: Patient/Caregiver Verbalized Understanding of Information    Goals:   Encounter Problems       Encounter Problems (Active)       Balance       STG-Patient will be independent with assistive device dynamic stand task >5 minutes for ADL completion   (Progressing)       Start:   02/20/24    Expected End:  03/05/24               Dressings Lower Extremities       STG - Patient will complete lower body dressing independently with comp strategies and AE  (Progressing)       Start:  02/20/24    Expected End:  03/05/24                 Mobility       STG-Patient will be independent with assistive device functional mobility tasks   (Progressing)       Start:  02/20/24    Expected End:  03/05/24                   Transfers       STG - Patient will perform car transfers independently demonstrating good safety  (Progressing)       Start:  02/20/24    Expected End:  03/05/24            STG-Patient will be independent with functional transfers demonstrating good safety   (Progressing)       Start:  02/20/24    Expected End:  03/05/24

## 2024-02-20 NOTE — PROGRESS NOTES
"John Casale is a 68 y.o. male on day 1 of admission presenting with Primary osteoarthritis of right hip.    Subjective   Surgical findings reviewed.  Pain well-controlled on current regimen.  PT/OT assessment appreciated and he is mobilizing with a walker for assistance.  Plan for discharge home was discussed with, and agreeable to, the patient.       Objective     Alert, orient x 3, cooperative the examination.  Examination of the right lower extremity reveals Mepilex dressing is clean and dry without drainage.  Right thigh swollen and supple.  Right EHL, plantarflexion, dorsiflexion are 4+/5.  Able to isometrically fire right quadriceps.  Sensory intact light touch in the deep/superficial/common peroneal, saphenous, tibial, sural nerve distributions.  DP and popliteal pulses are 2+ with capillary refill less than 2 seconds.  Negative Homans' sign.    Last Recorded Vitals  Blood pressure 110/63, pulse 78, temperature 36.7 °C (98.1 °F), temperature source Temporal, resp. rate 16, height 1.778 m (5' 10\"), weight 86.2 kg (190 lb), SpO2 93 %.  Intake/Output last 3 Shifts:  I/O last 3 completed shifts:  In: 3183.8 (36.9 mL/kg) [P.O.:240; I.V.:2843.8 (33 mL/kg); IV Piggyback:100]  Out: 2690 (31.2 mL/kg) [Urine:2340 (0.8 mL/kg/hr); Blood:350]  Weight: 86.2 kg     Relevant Results    Postoperative AP pelvis was reviewed and demonstrates uncemented acetabular and femoral components in good position without fracture, dislocation, or other acute complication.  Limb lengths and offset of been restored.    Scheduled medications  acetaminophen, 975 mg, oral, q8h ARIEL  ascorbic acid, 500 mg, oral, Daily  aspirin, 81 mg, oral, BID  atenolol, 25 mg, oral, Daily  atorvastatin, 20 mg, oral, Nightly  budesonide, 0.5 mg, nebulization, BID  celecoxib, 200 mg, oral, Daily  formoterol, 20 mcg, nebulization, BID  loratadine, 10 mg, oral, Daily  lubricating eye drops, 1 drop, Both Eyes, BID  metFORMIN XR, 500 mg, oral, Daily with evening " meal  montelukast, 10 mg, oral, Nightly  pantoprazole, 20 mg, oral, Daily before breakfast  pregabalin, 100 mg, oral, BID  sennosides-docusate sodium, 1 tablet, oral, Daily      Continuous medications  oxygen, 2 L/min      PRN medications  PRN medications: benzocaine-menthol, diphenhydrAMINE, magnesium hydroxide, morphine, naloxone, oxyCODONE, oxyCODONE, oxyCODONE, sodium phosphates  Results for orders placed or performed during the hospital encounter of 02/19/24 (from the past 24 hour(s))   POCT GLUCOSE   Result Value Ref Range    POCT Glucose 233 (H) 74 - 99 mg/dL   CBC   Result Value Ref Range    WBC 10.7 4.4 - 11.3 x10*3/uL    nRBC 0.0 0.0 - 0.0 /100 WBCs    RBC 3.21 (L) 4.50 - 5.90 x10*6/uL    Hemoglobin 10.0 (L) 13.5 - 17.5 g/dL    Hematocrit 29.7 (L) 41.0 - 52.0 %    MCV 93 80 - 100 fL    MCH 31.2 26.0 - 34.0 pg    MCHC 33.7 32.0 - 36.0 g/dL    RDW 13.2 11.5 - 14.5 %    Platelets 222 150 - 450 x10*3/uL   Basic metabolic panel   Result Value Ref Range    Glucose 226 (H) 74 - 99 mg/dL    Sodium 136 136 - 145 mmol/L    Potassium 3.9 3.5 - 5.3 mmol/L    Chloride 101 98 - 107 mmol/L    Bicarbonate 27 21 - 32 mmol/L    Anion Gap 12 10 - 20 mmol/L    Urea Nitrogen 19 6 - 23 mg/dL    Creatinine 0.71 0.50 - 1.30 mg/dL    eGFR >90 >60 mL/min/1.73m*2    Calcium 8.3 (L) 8.6 - 10.3 mg/dL   POCT GLUCOSE   Result Value Ref Range    POCT Glucose 228 (H) 74 - 99 mg/dL   POCT GLUCOSE   Result Value Ref Range    POCT Glucose 227 (H) 74 - 99 mg/dL                            Assessment/Plan   Principal Problem:    Primary osteoarthritis of right hip  Active Problems:    S/P total right hip arthroplasty    Acute postoperative pain    Diabetes mellitus (CMS/HCC)    Asthma    Acute postoperative anemia due to expected blood loss    -POD # 1 following right total hip replacement:  Continue to mobilize with PT/OT, full weightbearing as tolerated, walker for assist, no pivoting on right lower extremity.  Mepilex dressing to remain in  place.  -VTE prophylaxis:  Chemoprophylaxis with aspirin 81 mg by mouth twice daily.  Mechanical prophylaxis to incorporate SCDs and early ambulation as tolerated.  -GI prophylaxis:  PPI, stool softener.  -Acute postoperative anemia due to surgical blood loss:  Hgb 10.0, patient is hemodynamically stable, mild fatigue.  Received oral tranexamic acid preoperatively  -Pain control:  Scheduled acetaminophen, intermittent oxycodone, IV morphine for breakthrough pain.  Scheduled celecoxib  -Disposition:  Plan for discharge home today, with plan follow-up in my office in 6-9 days for postoperative assessment and incision check.    Based on my clinical judgement, the patient was provided with a 7-day prescription for opioid medication at 30 MED, indicated for treatment of acute pain in the setting of recent right total hip replacement.  OARRS report has been run and demonstrated an appropriate time course.  The patient has been provided with counselling pertaining to safe use of opioid medication.    The patient has confirmed that he has a walker with 5 inch front wheels available for personal use following discharge.    On the morning following discharge, patient will initiate 7-day course of oral Bactrim for extended prophylaxis against surgical site infection.        Jer Logan MD

## 2024-02-20 NOTE — PROGRESS NOTES
"John Casale is a 68 y.o. male on day 1 of admission presenting with Primary osteoarthritis of right hip.    Subjective   Mr. Casale reports moderate right hip discomfort. He is looking forward to getting out of bed and ambulating.  He has been voiding without any issues and is tolerating oral intake.       Objective     Physical Exam  Vitals reviewed.   HENT:      Head: Normocephalic.      Mouth/Throat:      Mouth: Mucous membranes are moist.      Pharynx: Oropharynx is clear.   Eyes:      Extraocular Movements: Extraocular movements intact.   Cardiovascular:      Rate and Rhythm: Normal rate.   Pulmonary:      Effort: Pulmonary effort is normal.   Abdominal:      Palpations: Abdomen is soft.   Musculoskeletal:      Comments: right hip dressing is dry and intact.  light touch sensation is intact, ankle dorsiflexion/plantar flexion is intact. DP 2+/2 palpable       Skin:     General: Skin is warm and dry.      Capillary Refill: Capillary refill takes less than 2 seconds.   Neurological:      General: No focal deficit present.      Mental Status: He is alert. Mental status is at baseline.   Psychiatric:         Mood and Affect: Mood normal.         Last Recorded Vitals  Blood pressure 110/63, pulse 78, temperature 36.7 °C (98.1 °F), temperature source Temporal, resp. rate 16, height 1.778 m (5' 10\"), weight 86.2 kg (190 lb), SpO2 93 %.  Intake/Output last 3 Shifts:  I/O last 3 completed shifts:  In: 3183.8 (36.9 mL/kg) [P.O.:240; I.V.:2843.8 (33 mL/kg); IV Piggyback:100]  Out: 2690 (31.2 mL/kg) [Urine:2340 (0.8 mL/kg/hr); Blood:350]  Weight: 86.2 kg     Relevant Results      Scheduled medications  acetaminophen, 975 mg, oral, q8h ARIEL  ascorbic acid, 500 mg, oral, Daily  aspirin, 81 mg, oral, BID  atenolol, 25 mg, oral, Daily  atorvastatin, 20 mg, oral, Nightly  budesonide, 0.5 mg, nebulization, BID  celecoxib, 200 mg, oral, Daily  formoterol, 20 mcg, nebulization, BID  loratadine, 10 mg, oral, Daily  lubricating eye " drops, 1 drop, Both Eyes, BID  metFORMIN XR, 500 mg, oral, Daily with evening meal  montelukast, 10 mg, oral, Nightly  pantoprazole, 20 mg, oral, Daily before breakfast  pregabalin, 100 mg, oral, BID  sennosides-docusate sodium, 1 tablet, oral, Daily      Continuous medications  oxygen, 2 L/min      PRN medications  PRN medications: benzocaine-menthol, diphenhydrAMINE, magnesium hydroxide, morphine, naloxone, oxyCODONE, oxyCODONE, oxyCODONE, sodium phosphates  Results for orders placed or performed during the hospital encounter of 02/19/24 (from the past 24 hour(s))   POCT GLUCOSE   Result Value Ref Range    POCT Glucose 179 (H) 74 - 99 mg/dL   POCT GLUCOSE   Result Value Ref Range    POCT Glucose 233 (H) 74 - 99 mg/dL   CBC   Result Value Ref Range    WBC 10.7 4.4 - 11.3 x10*3/uL    nRBC 0.0 0.0 - 0.0 /100 WBCs    RBC 3.21 (L) 4.50 - 5.90 x10*6/uL    Hemoglobin 10.0 (L) 13.5 - 17.5 g/dL    Hematocrit 29.7 (L) 41.0 - 52.0 %    MCV 93 80 - 100 fL    MCH 31.2 26.0 - 34.0 pg    MCHC 33.7 32.0 - 36.0 g/dL    RDW 13.2 11.5 - 14.5 %    Platelets 222 150 - 450 x10*3/uL   Basic metabolic panel   Result Value Ref Range    Glucose 226 (H) 74 - 99 mg/dL    Sodium 136 136 - 145 mmol/L    Potassium 3.9 3.5 - 5.3 mmol/L    Chloride 101 98 - 107 mmol/L    Bicarbonate 27 21 - 32 mmol/L    Anion Gap 12 10 - 20 mmol/L    Urea Nitrogen 19 6 - 23 mg/dL    Creatinine 0.71 0.50 - 1.30 mg/dL    eGFR >90 >60 mL/min/1.73m*2    Calcium 8.3 (L) 8.6 - 10.3 mg/dL   POCT GLUCOSE   Result Value Ref Range    POCT Glucose 228 (H) 74 - 99 mg/dL   POCT GLUCOSE   Result Value Ref Range    POCT Glucose 227 (H) 74 - 99 mg/dL             XR pelvis 1-2 views    Result Date: 2/19/2024  Interpreted By:  Garfield Pulido, STUDY: XR PELVIS 1-2 VIEWS;  2/19/2024 1:58 pm   INDICATION: Signs/Symptoms:Post op hip.   COMPARISON: None.   ACCESSION NUMBER(S): [id number]   ORDERING CLINICIAN: LEO JAMES   FINDINGS: Bilateral hip arthroplasties appear to be in  anatomic alignment. Iliac crests not entirely imaged.       Hip arthroplasties in anatomic alignment.     Signed by: Garfield Pulido 2/19/2024 2:06 PM Dictation workstation:   [id number]              Assessment/Plan   Principal Problem:    Primary osteoarthritis of right hip  Active Problems:    S/P total right hip arthroplasty    Acute postoperative pain    Diabetes mellitus (CMS/HCC)    Asthma    Acute postoperative anemia due to expected blood loss    POD #1 s/p 1 IMELDA  Continue PT/OT, WBAT right  LE; right hip precautions, no pivoting right LE  Using incentive spirometer   Reviewed am labs  Multimodal pain regimen  Surgical hip dressing to be removed on post op day #7  VTE prophylaxis: aspirin 81mg BID  When appropriate, will discharge home with [facility]  Follow up with Dr. Logan as directed         I spent 25 minutes in the professional and overall care of this patient.      Angelica Ocampo PA-C

## 2024-02-20 NOTE — PROGRESS NOTES
Physical Therapy    Physical Therapy Evaluation & Treatment    Patient Name: John Casale  MRN: [id number]  Today's Date: 2/19/2024   Time Calculation  Start Time: 1622  Stop Time: 1652  Time Calculation (min): 30 min    Assessment/Plan   PT Assessment  PT Assessment Results: Decreased strength, Decreased endurance, Decreased mobility, Decreased coordination, Pain  Rehab Prognosis: Good  Evaluation/Treatment Tolerance: Patient limited by fatigue (and lightheaded.)  Medical Staff Made Aware: Yes  End of Session Communication: Bedside nurse  Assessment Comment: expect he'll be able to participate more tomorrow.  End of Session Patient Position: Bed, 3 rail up, Alarm on  IP OR SWING BED PT PLAN  Inpatient or Swing Bed: Inpatient  PT Plan  Treatment/Interventions: Bed mobility, Transfer training, Gait training, Stair training, Strengthening, Endurance training, Range of motion, Therapeutic exercise, Therapeutic activity, Home exercise program (Instructed wife in a/p, qs, and gs exercises. asked her to start them with pt., after he wakes up.  Handout issued.)  PT Plan:  (see till disch'd., then expect HHC/PT.)  PT Frequency: BID  PT Discharge Recommendations: Low intensity level of continued care  Equipment Recommended upon Discharge: Wheeled walker, Straight cane  PT Recommended Transfer Status: Assist x1  PT - OK to Discharge: Yes (when doctor allows.)    Current Problem:  Patient Active Problem List   Diagnosis    Primary osteoarthritis of right hip    S/P total right hip arthroplasty    Acute postoperative pain    Diabetes mellitus (CMS/HCC)    Asthma       Subjective     General Visit Information:  General  Reason for Referral: R THR  Referred By: Jer Logan MD  Past Medical History Relevant to Rehab: s/p L THR, O-A  Family/Caregiver Present: Yes  Prior to Session Communication: Bedside nurse  Patient Position Received: Bed, 3 rail up, Alarm on  Preferred Learning Style: verbal, visual, written  General  Comment: Has an IV, ice pack, hip abductor wedge, and 2L O2.    Home Living:  Home Living  Type of Home: House  Lives With: Spouse  Home Adaptive Equipment: Walker rolling or standard  Home Layout: Two level  Home Access: Stairs to enter with rails  Entrance Stairs-Number of Steps: 4  Bathroom Shower/Tub: Tub/shower unit  Bathroom Equipment: Shower chair with back  Home Living Comments: bed/bathroom on 1st floor.    Prior Level of Function:  Prior Function Per Pt/Caregiver Report  Level of [place]: Independent with homemaking with ambulation, Independent with ADLs and functional transfers  Prior Function Comments: walked in house without a device.    Precautions:  Precautions  LE Weight Bearing Status: Weight Bearing as Tolerated  Medical Precautions: Fall precautions  Post-Surgical Precautions:  (No pivoting on R le when up.)  Precautions Comment: No orders for std. THR precautions were found.    Vital Signs:     Objective     Pain:  Pain Assessment  Pain Assessment: 0-10  Pain Score: 5 - Moderate pain  Pain Type: Surgical pain  Pain Location: Hip  Pain Orientation: Right  Pain Interventions: Medication (See MAR), Cold pack, Rest    Cognition:  Cognition  Overall Cognitive Status: Within Functional Limits    General Assessments:  General Observation  General Observation: still very sleepy.  dozed off a few times during this eval..   Activity Tolerance  Endurance: Decreased tolerance for upright activites (reported lightheaded, nauseous in sit and standing.)  Sensation  Light Touch: No apparent deficits  Sensation Comment: reports chronic neuropathy in both feet/tingling.  not numb.  Strength  Strength Comments: 3/5=R Quads. Poor= SLR (5/5= L Quads., and a.tibs..)     Coordination  Movements are Fluid and Coordinated: No  Coordination Comment: unable to fully assess.  Postural Control  Postural Control: Within Functional Limits  Posture Comment: Lightheaded in standing.          Functional Assessments:     Bed  Mobility  Bed Mobility: Yes  Bed Mobility 1  Bed Mobility 1: Supine to sitting, Sitting to supine  Level of Assistance 1: Minimum assistance (x1.)  Transfers  Transfer: Yes  Transfer 1  Transfer From 1: Sit to, Stand to  Transfer Device 1: Walker, Gait belt  Transfer Level of Assistance 1: Minimum assistance (a 1.)  Trials/Comments 1: Attempted to use the urinal when standing without success.  Ambulation/Gait Training  Ambulation/Gait Training Performed: No (only able to stand x 1 minute in the walker at bedside.)  Stairs  Stairs: No       Extremity/Trunk Assessments:                Treatments:           Bed Mobility  Bed Mobility: Yes  Bed Mobility 1  Bed Mobility 1: Supine to sitting, Sitting to supine  Level of Assistance 1: Minimum assistance (x1.)  Ambulation/Gait Training  Ambulation/Gait Training Performed: No (only able to stand x 1 minute in the walker at bedside.)  Transfers  Transfer: Yes  Transfer 1  Transfer From 1: Sit to, Stand to  Transfer Device 1: Walker, Gait belt  Transfer Level of Assistance 1: Minimum assistance (a 1.)  Trials/Comments 1: Attempted to use the urinal when standing without success.  Stairs  Stairs: No    Outcome Measures:  [facility] Basic Mobility  Turning from your back to your side while in a flat bed without using bedrails: A little  Moving from lying on your back to sitting on the side of a flat bed without using bedrails: A little  Moving to and from bed to chair (including a wheelchair): A little  Standing up from a chair using your arms (e.g. wheelchair or bedside chair): A little  To walk in hospital room: A lot  Climbing 3-5 steps with railing: A lot  Basic Mobility - Total Score: 16                            Goals:  Encounter Problems       Encounter Problems (Active)       Mobility       STG - Patient will ambulate x 250 ft. with w/w, Modif. Indep..        Start:  02/19/24    Expected End:  03/04/24            STG - Patient will ascend and descend four to six stairs with  a railing and a cane, or CGA with railing.         Start:  02/19/24    Expected End:  03/04/24            Goal 1:  Demo 4/5=R quads., and a Good SLR.         Start:  02/19/24    Expected End:  03/04/24               Safety       STG - Patient uses gait belt during all transfers and w/w amb. trials.         Start:  02/19/24    Expected End:  03/04/24               Transfers       STG - Patient will perform bed mobility with Modif. Indep. skill.         Start:  02/19/24    Expected End:  03/04/24            STG - Patient will transfer sit to and from stand into a w/w, with Modif. Indep. skill.         Start:  02/19/24    Expected End:  03/04/24                 Education Documentation  Handouts, taught by Matias Altamirano, PT at 2/19/2024  7:20 PM.  Learner: Patient, Family  Readiness: Acceptance  Method: Demonstration, Explanation  Response: Demonstrated Understanding, Needs Reinforcement    Precautions, taught by Matias Altamirano PT at 2/19/2024  7:20 PM.  Learner: Patient, Family  Readiness: Acceptance  Method: Demonstration, Explanation  Response: Demonstrated Understanding, Needs Reinforcement    Body Mechanics, taught by Matias Altamirano, PT at 2/19/2024  7:20 PM.  Learner: Patient, Family  Readiness: Acceptance  Method: Demonstration, Explanation  Response: Demonstrated Understanding, Needs Reinforcement    Home Exercise Program, taught by Matias Alatmirano, PT at 2/19/2024  7:20 PM.  Learner: Patient, Family  Readiness: Acceptance  Method: Demonstration, Explanation  Response: Demonstrated Understanding, Needs Reinforcement    Mobility Training, taught by Matias Altamirano, PT at 2/19/2024  7:20 PM.  Learner: Patient, Family  Readiness: Acceptance  Method: Demonstration, Explanation  Response: Demonstrated Understanding, Needs Reinforcement    Education Comments  No comments found.

## 2024-02-20 NOTE — CARE PLAN
The patient's goals for the shift include  pain control.    The clinical goals for the shift include pain control, tolerate increased mobility.

## 2024-02-20 NOTE — DISCHARGE SUMMARY
Discharge Diagnosis  Primary osteoarthritis of right hip    Issues Requiring Follow-Up  None    Test Results Pending At Discharge  Pending Labs       No current pending labs.            Hospital Course   Scheduled for elective right total hip replacement for indication of primary osteoarthritis with associated pain.  Underwent this procedure on 2/19 without complication.  Postoperative x-rays demonstrated components in good position.  Admitted under extended recovery status for postoperative pain control, physical therapy, and medical management.  Hospital course was remarkable for:    -Acute postoperative pain: Managed with oral and intravenous pain medication.  Perioperative dexamethasone also given.  Plan for scheduled acetaminophen and Celebrex, intermittent oxycodone upon discharge.  -Acute postoperative anemia due to surgical blood loss: Hemoglobin 10.0 on 2/20, remained hemodynamically stable.  Received oral tranexamic acid preoperatively.  No further follow-up, evaluation, or management necessary.  -Prophylaxis against venous thromboembolism: Sequential compression devices utilized, early ambulation encouraged.  Initiated aspirin 81 mg by mouth twice daily for chemoprophylaxis on the evening of 2/19 and this will be continued for 30 days following discharge.  -Prophylaxis against surgical site infection: Received intravenous Ancef preoperatively and postoperatively to complete 23-hour total course.  On the morning following discharge, patient will initiate 7-day course of oral Bactrim for send prophylaxis.    Maintained weightbearing as tolerated, walker for assistance, no pivoting on right lower extremity.  Mepilex dressing maintained in place.  Evaluated by Physical and Occupational Therapy, progressed appropriately, and discharged home in stable condition on 2/20.  Plan for follow-up with Dr. Logan in 6-9 days for postoperative assessment and incision check.    Pertinent Physical Exam At Time of  Discharge  Alert, orient x 3, cooperative the examination.  Examination of the right lower extremity reveals Mepilex dressing is clean and dry without drainage.  Right thigh swollen and supple.  Right EHL, plantarflexion, dorsiflexion are 4+/5.  Able to isometrically fire right quadriceps.  Sensory intact light touch in the deep/superficial/common peroneal, saphenous, tibial, sural nerve distributions.  DP and popliteal pulses are 2+ with capillary refill less than 2 seconds.  Negative Homans' sign.    Home Medications     Medication List      START taking these medications     acetaminophen 325 mg tablet; Commonly known as: Tylenol; Take 2 tablets   (650 mg) by mouth every 6 hours.   aspirin 81 mg EC tablet; Take 1 tablet (81 mg) by mouth 2 times a day.   Continue on a regular schedule for 30 days.   celecoxib 200 mg capsule; Commonly known as: CeleBREX; Take 1 capsule   (200 mg) by mouth once daily. Continue on a regular schedule for 30 days.   oxyCODONE 5 mg immediate release tablet; Commonly known as: Roxicodone;   Take 1 tablet (5 mg) by mouth every 6 hours if needed (Pain) for up to 7   days.   sennosides-docusate sodium 8.6-50 mg tablet; Commonly known as:   Katerina-Colace; Take 1 tablet by mouth once daily. Continue on a regular   schedule for 30 days.   sulfamethoxazole-trimethoprim 800-160 mg tablet; Commonly known as:   Bactrim DS; Take 1 tablet by mouth 2 times a day for 7 days. Start taking   on the morning of 2/21 and continue on a regular schedule for 7 days.     CONTINUE taking these medications     ascorbic acid 500 mg tablet; Commonly known as: Vitamin C   atenolol 25 mg tablet; Commonly known as: Tenormin   atorvastatin 20 mg tablet; Commonly known as: Lipitor   b complex 0.4 mg tablet   cholecalciferol 25 MCG (1000 UT) tablet; Commonly known as: Vitamin D-3   cycloSPORINE 0.05 % ophthalmic emulsion; Commonly known as: Restasis   esomeprazole 20 mg DR capsule; Commonly known as: NexIUM   fexofenadine  180 mg tablet; Commonly known as: Allegra   fluticasone propion-salmeteroL 250-50 mcg/dose diskus inhaler; Commonly   known as: Advair Diskus   lactobacillus acidophilus tablet tablet   metFORMIN  mg 24 hr tablet; Commonly known as: Glucophage-XR   montelukast 10 mg tablet; Commonly known as: Singulair   multivitamin tablet   pregabalin 100 mg capsule; Commonly known as: Lyrica   zinc gluconate 50 mg tablet     STOP taking these medications     chlorhexidine 0.12 % solution; Commonly known as: Peridex       Outpatient Follow-Up  No future appointments.    Jer Logan MD

## 2024-02-21 NOTE — NURSING NOTE
Patient with discharge order for today. Discharge instructions reviewed with patient and his wife (at bedside); stated understanding. All belongings packed up by patient and his wife. Patient left unit via WC at 5:50 PM, escorted by wife and transporter. Patient A/O x 4 upon discharge.

## 2025-03-14 ENCOUNTER — TELEPHONE (OUTPATIENT)
Dept: INPATIENT UNIT | Facility: HOSPITAL | Age: 70
End: 2025-03-14
Payer: MEDICARE

## 2025-03-14 ASSESSMENT — HOOS JR
RISING FROM SITTING: MILD
HOOS JR TOTAL INTERVAL SCORE: 73.47
WALKING ON UNEVEN SURFACE: MILD
GOING UP OR DOWN STAIRS: MILD
BENDING TO THE FLOOR TO PICK UP OBJECT: MILD
LYING IN BED (TURNING OVER, MAINTAINING HIP POSITION): MILD

## (undated) DEVICE — DRESSING, MEPILEX BORDER, POST-OP AG, 4 X 12 IN

## (undated) DEVICE — Device

## (undated) DEVICE — APPLICATOR, CHLORAPREP, W/ORANGE TINT, 26ML

## (undated) DEVICE — GLOVE, SURGICAL, PROTEXIS PI ORTHO, 8.0, PF, LF

## (undated) DEVICE — TOWEL PACK, STERILE, 4/PACK, BLUE

## (undated) DEVICE — SUTURE, VICRYL, 2-0, 36 IN, CT-1, UNDYED

## (undated) DEVICE — TISSUE ADHESIVE, PREMIERPRO EXOFIN, PRECISION PEN HV, 1.0ML

## (undated) DEVICE — GOWN, ASTOUND, XL

## (undated) DEVICE — SUTURE, ETHIBOND, 5, 30 IN, V40, MULTIPACK, GREEN

## (undated) DEVICE — SOLUTION, IRRIGATION, SODIUM CHLORIDE 0.9%, 1000 ML, POUR BOTTLE

## (undated) DEVICE — SOLUTION, IRRIGATION, STERILE WATER, 1000 ML, POUR BOTTLE

## (undated) DEVICE — SUTURE, STRATAFIX, SPIRAL MONOCRYL PLUS, 3-0, PS-2 45CM, UNDYED

## (undated) DEVICE — DRAPE, INSTRUMENT, W/POUCH, STERI DRAPE, 7 X 11 IN, DISPOSABLE, STERILE

## (undated) DEVICE — BLADE, RECIPROCATING, LONG, HEAVY DUTY, FIXED POINT .14

## (undated) DEVICE — HOOD, STERISHIELD T4 SYSTEM

## (undated) DEVICE — GLOVE, SURGICAL, PROTEXIS PI W/NEU-THERA, 8.0, PF, LF

## (undated) DEVICE — BANDAGE, COFLEX, 6 X 5 YDS, FOAM TAN, STERILE, LF

## (undated) DEVICE — DRAPE, SHEET, U, STERI DRAPE, 47 X 51 IN, DISPOSABLE, STERILE

## (undated) DEVICE — SUTURE, STARTAFIX, SYMMETRIC, PDS PLUS, 60CM CT-1

## (undated) DEVICE — DRAPE, INCISE, ANTIMICROBIAL, IOBAN 2, STERI DRAPE, 23 X 33 IN, DISPOSABLE, STERILE

## (undated) DEVICE — WOUND SYSTEM, DEBRIDEMENT & CLEANING, O.R DUOPAK

## (undated) DEVICE — TRAY, SURESTEP, SILICONE DRAINAGE BAG, STATLOCK, 16FR

## (undated) DEVICE — SKIN CLOSURE SYS, PREMIERPRO EXOFIN, 1-4CM X 22CM, 1.75G TUBE